# Patient Record
Sex: FEMALE | Race: WHITE | HISPANIC OR LATINO | Employment: OTHER | URBAN - METROPOLITAN AREA
[De-identification: names, ages, dates, MRNs, and addresses within clinical notes are randomized per-mention and may not be internally consistent; named-entity substitution may affect disease eponyms.]

---

## 2021-06-19 ENCOUNTER — APPOINTMENT (OUTPATIENT)
Dept: RADIOLOGY | Facility: MEDICAL CENTER | Age: 46
DRG: 064 | End: 2021-06-19
Attending: INTERNAL MEDICINE
Payer: COMMERCIAL

## 2021-06-19 ENCOUNTER — APPOINTMENT (OUTPATIENT)
Dept: RADIOLOGY | Facility: MEDICAL CENTER | Age: 46
DRG: 064 | End: 2021-06-19
Attending: EMERGENCY MEDICINE
Payer: COMMERCIAL

## 2021-06-19 ENCOUNTER — HOSPITAL ENCOUNTER (INPATIENT)
Facility: MEDICAL CENTER | Age: 46
LOS: 4 days | DRG: 064 | End: 2021-06-23
Attending: EMERGENCY MEDICINE | Admitting: HOSPITALIST
Payer: COMMERCIAL

## 2021-06-19 ENCOUNTER — APPOINTMENT (OUTPATIENT)
Dept: RADIOLOGY | Facility: MEDICAL CENTER | Age: 46
DRG: 064 | End: 2021-06-19
Attending: STUDENT IN AN ORGANIZED HEALTH CARE EDUCATION/TRAINING PROGRAM
Payer: COMMERCIAL

## 2021-06-19 DIAGNOSIS — I62.9 INTRACRANIAL HEMORRHAGE (HCC): ICD-10-CM

## 2021-06-19 DIAGNOSIS — G08 THROMBOSIS OF SUPERIOR SAGITTAL SINUS: ICD-10-CM

## 2021-06-19 DIAGNOSIS — R29.898 LEFT ARM WEAKNESS: ICD-10-CM

## 2021-06-19 PROBLEM — R10.9 ABDOMINAL SPASMS: Status: ACTIVE | Noted: 2021-06-19

## 2021-06-19 PROBLEM — Z85.3 HISTORY OF BREAST CANCER: Status: ACTIVE | Noted: 2021-06-19

## 2021-06-19 LAB
ABO GROUP BLD: NORMAL
ALBUMIN SERPL BCP-MCNC: 4.2 G/DL (ref 3.2–4.9)
ALBUMIN/GLOB SERPL: 1.4 G/DL
ALP SERPL-CCNC: 138 U/L (ref 30–99)
ALT SERPL-CCNC: 66 U/L (ref 2–50)
ANION GAP SERPL CALC-SCNC: 11 MMOL/L (ref 7–16)
APTT PPP: 21.7 SEC (ref 24.7–36)
AST SERPL-CCNC: 34 U/L (ref 12–45)
BASOPHILS # BLD AUTO: 1.3 % (ref 0–1.8)
BASOPHILS # BLD: 0.06 K/UL (ref 0–0.12)
BILIRUB SERPL-MCNC: 0.8 MG/DL (ref 0.1–1.5)
BLD GP AB SCN SERPL QL: NORMAL
BUN SERPL-MCNC: 7 MG/DL (ref 8–22)
CALCIUM SERPL-MCNC: 9.1 MG/DL (ref 8.5–10.5)
CFT BLD TEG: 4.5 MIN (ref 5–10)
CHLORIDE SERPL-SCNC: 100 MMOL/L (ref 96–112)
CHOLEST SERPL-MCNC: 121 MG/DL (ref 100–199)
CLOT ANGLE BLD TEG: 66.5 DEGREES (ref 53–72)
CLOT LYSIS 30M P MA LENFR BLD TEG: 0 % (ref 0–8)
CO2 SERPL-SCNC: 24 MMOL/L (ref 20–33)
CREAT SERPL-MCNC: 0.46 MG/DL (ref 0.5–1.4)
CT.EXTRINSIC BLD ROTEM: 1.8 MIN (ref 1–3)
EKG IMPRESSION: NORMAL
EOSINOPHIL # BLD AUTO: 0.06 K/UL (ref 0–0.51)
EOSINOPHIL NFR BLD: 1.3 % (ref 0–6.9)
ERYTHROCYTE [DISTWIDTH] IN BLOOD BY AUTOMATED COUNT: 55.5 FL (ref 35.9–50)
GLOBULIN SER CALC-MCNC: 3.1 G/DL (ref 1.9–3.5)
GLUCOSE SERPL-MCNC: 104 MG/DL (ref 65–99)
HCT VFR BLD AUTO: 39.3 % (ref 37–47)
HDLC SERPL-MCNC: 38 MG/DL
HGB BLD-MCNC: 13.4 G/DL (ref 12–16)
IMM GRANULOCYTES # BLD AUTO: 0.02 K/UL (ref 0–0.11)
IMM GRANULOCYTES NFR BLD AUTO: 0.4 % (ref 0–0.9)
INR PPP: 1.07 (ref 0.87–1.13)
LDLC SERPL CALC-MCNC: 58 MG/DL
LIPASE SERPL-CCNC: 24 U/L (ref 11–82)
LYMPHOCYTES # BLD AUTO: 1.67 K/UL (ref 1–4.8)
LYMPHOCYTES NFR BLD: 34.9 % (ref 22–41)
MCF BLD TEG: 64.2 MM (ref 50–70)
MCH RBC QN AUTO: 36.5 PG (ref 27–33)
MCHC RBC AUTO-ENTMCNC: 34.1 G/DL (ref 33.6–35)
MCV RBC AUTO: 107.1 FL (ref 81.4–97.8)
MONOCYTES # BLD AUTO: 0.76 K/UL (ref 0–0.85)
MONOCYTES NFR BLD AUTO: 15.9 % (ref 0–13.4)
NEUTROPHILS # BLD AUTO: 2.21 K/UL (ref 2–7.15)
NEUTROPHILS NFR BLD: 46.2 % (ref 44–72)
NRBC # BLD AUTO: 0 K/UL
NRBC BLD-RTO: 0 /100 WBC
PA AA BLD-ACNC: 0 %
PA ADP BLD-ACNC: 13.2 %
PLATELET # BLD AUTO: 265 K/UL (ref 164–446)
PMV BLD AUTO: 9.5 FL (ref 9–12.9)
POTASSIUM SERPL-SCNC: 3.8 MMOL/L (ref 3.6–5.5)
PROT SERPL-MCNC: 7.3 G/DL (ref 6–8.2)
PROTHROMBIN TIME: 13.6 SEC (ref 12–14.6)
RBC # BLD AUTO: 3.67 M/UL (ref 4.2–5.4)
RH BLD: NORMAL
SARS-COV+SARS-COV-2 AG RESP QL IA.RAPID: NOTDETECTED
SODIUM SERPL-SCNC: 135 MMOL/L (ref 135–145)
SODIUM SERPL-SCNC: 137 MMOL/L (ref 135–145)
SODIUM SERPL-SCNC: 140 MMOL/L (ref 135–145)
SPECIMEN SOURCE: NORMAL
TEG ALGORITHM TGALG: ABNORMAL
TRIGL SERPL-MCNC: 123 MG/DL (ref 0–149)
TROPONIN T SERPL-MCNC: <6 NG/L (ref 6–19)
WBC # BLD AUTO: 4.8 K/UL (ref 4.8–10.8)

## 2021-06-19 PROCEDURE — 85576 BLOOD PLATELET AGGREGATION: CPT | Mod: 91

## 2021-06-19 PROCEDURE — 85025 COMPLETE CBC W/AUTO DIFF WBC: CPT

## 2021-06-19 PROCEDURE — 700111 HCHG RX REV CODE 636 W/ 250 OVERRIDE (IP): Performed by: STUDENT IN AN ORGANIZED HEALTH CARE EDUCATION/TRAINING PROGRAM

## 2021-06-19 PROCEDURE — 770022 HCHG ROOM/CARE - ICU (200)

## 2021-06-19 PROCEDURE — 99291 CRITICAL CARE FIRST HOUR: CPT | Performed by: STUDENT IN AN ORGANIZED HEALTH CARE EDUCATION/TRAINING PROGRAM

## 2021-06-19 PROCEDURE — A9576 INJ PROHANCE MULTIPACK: HCPCS | Performed by: INTERNAL MEDICINE

## 2021-06-19 PROCEDURE — 700117 HCHG RX CONTRAST REV CODE 255: Performed by: EMERGENCY MEDICINE

## 2021-06-19 PROCEDURE — 700117 HCHG RX CONTRAST REV CODE 255: Performed by: INTERNAL MEDICINE

## 2021-06-19 PROCEDURE — C9803 HOPD COVID-19 SPEC COLLECT: HCPCS | Performed by: EMERGENCY MEDICINE

## 2021-06-19 PROCEDURE — U0003 INFECTIOUS AGENT DETECTION BY NUCLEIC ACID (DNA OR RNA); SEVERE ACUTE RESPIRATORY SYNDROME CORONAVIRUS 2 (SARS-COV-2) (CORONAVIRUS DISEASE [COVID-19]), AMPLIFIED PROBE TECHNIQUE, MAKING USE OF HIGH THROUGHPUT TECHNOLOGIES AS DESCRIBED BY CMS-2020-01-R: HCPCS

## 2021-06-19 PROCEDURE — 70450 CT HEAD/BRAIN W/O DYE: CPT

## 2021-06-19 PROCEDURE — 99291 CRITICAL CARE FIRST HOUR: CPT

## 2021-06-19 PROCEDURE — 74177 CT ABD & PELVIS W/CONTRAST: CPT

## 2021-06-19 PROCEDURE — U0005 INFEC AGEN DETEC AMPLI PROBE: HCPCS

## 2021-06-19 PROCEDURE — 86900 BLOOD TYPING SEROLOGIC ABO: CPT

## 2021-06-19 PROCEDURE — 85347 COAGULATION TIME ACTIVATED: CPT

## 2021-06-19 PROCEDURE — 84295 ASSAY OF SERUM SODIUM: CPT

## 2021-06-19 PROCEDURE — 93005 ELECTROCARDIOGRAM TRACING: CPT | Performed by: EMERGENCY MEDICINE

## 2021-06-19 PROCEDURE — 71045 X-RAY EXAM CHEST 1 VIEW: CPT

## 2021-06-19 PROCEDURE — 96375 TX/PRO/DX INJ NEW DRUG ADDON: CPT

## 2021-06-19 PROCEDURE — 70496 CT ANGIOGRAPHY HEAD: CPT

## 2021-06-19 PROCEDURE — 85730 THROMBOPLASTIN TIME PARTIAL: CPT

## 2021-06-19 PROCEDURE — 70553 MRI BRAIN STEM W/O & W/DYE: CPT

## 2021-06-19 PROCEDURE — 99221 1ST HOSP IP/OBS SF/LOW 40: CPT | Performed by: INTERNAL MEDICINE

## 2021-06-19 PROCEDURE — 96374 THER/PROPH/DIAG INJ IV PUSH: CPT

## 2021-06-19 PROCEDURE — 85384 FIBRINOGEN ACTIVITY: CPT

## 2021-06-19 PROCEDURE — 86901 BLOOD TYPING SEROLOGIC RH(D): CPT

## 2021-06-19 PROCEDURE — 80053 COMPREHEN METABOLIC PANEL: CPT

## 2021-06-19 PROCEDURE — 36415 COLL VENOUS BLD VENIPUNCTURE: CPT

## 2021-06-19 PROCEDURE — 94760 N-INVAS EAR/PLS OXIMETRY 1: CPT

## 2021-06-19 PROCEDURE — 700105 HCHG RX REV CODE 258: Performed by: INTERNAL MEDICINE

## 2021-06-19 PROCEDURE — 84484 ASSAY OF TROPONIN QUANT: CPT

## 2021-06-19 PROCEDURE — 700111 HCHG RX REV CODE 636 W/ 250 OVERRIDE (IP): Performed by: EMERGENCY MEDICINE

## 2021-06-19 PROCEDURE — 99221 1ST HOSP IP/OBS SF/LOW 40: CPT | Performed by: PSYCHIATRY & NEUROLOGY

## 2021-06-19 PROCEDURE — 85610 PROTHROMBIN TIME: CPT

## 2021-06-19 PROCEDURE — 700111 HCHG RX REV CODE 636 W/ 250 OVERRIDE (IP): Performed by: INTERNAL MEDICINE

## 2021-06-19 PROCEDURE — 86850 RBC ANTIBODY SCREEN: CPT

## 2021-06-19 PROCEDURE — 700111 HCHG RX REV CODE 636 W/ 250 OVERRIDE (IP): Performed by: PSYCHIATRY & NEUROLOGY

## 2021-06-19 PROCEDURE — 0042T CT-CEREBRAL PERFUSION ANALYSIS: CPT

## 2021-06-19 PROCEDURE — 80061 LIPID PANEL: CPT

## 2021-06-19 PROCEDURE — 87426 SARSCOV CORONAVIRUS AG IA: CPT

## 2021-06-19 PROCEDURE — 70498 CT ANGIOGRAPHY NECK: CPT

## 2021-06-19 PROCEDURE — 83690 ASSAY OF LIPASE: CPT

## 2021-06-19 RX ORDER — ACETAMINOPHEN 500 MG
1000 TABLET ORAL 2 TIMES DAILY PRN
COMMUNITY

## 2021-06-19 RX ORDER — LABETALOL HYDROCHLORIDE 5 MG/ML
10 INJECTION, SOLUTION INTRAVENOUS EVERY 4 HOURS PRN
Status: DISCONTINUED | OUTPATIENT
Start: 2021-06-19 | End: 2021-06-23 | Stop reason: HOSPADM

## 2021-06-19 RX ORDER — ONDANSETRON 2 MG/ML
4 INJECTION INTRAMUSCULAR; INTRAVENOUS EVERY 4 HOURS PRN
Status: DISCONTINUED | OUTPATIENT
Start: 2021-06-19 | End: 2021-06-23 | Stop reason: HOSPADM

## 2021-06-19 RX ORDER — BISACODYL 10 MG
10 SUPPOSITORY, RECTAL RECTAL
Status: DISCONTINUED | OUTPATIENT
Start: 2021-06-19 | End: 2021-06-23 | Stop reason: HOSPADM

## 2021-06-19 RX ORDER — ACETAMINOPHEN 650 MG/1
650 SUPPOSITORY RECTAL EVERY 4 HOURS PRN
Status: DISCONTINUED | OUTPATIENT
Start: 2021-06-19 | End: 2021-06-19

## 2021-06-19 RX ORDER — LEVETIRACETAM 5 MG/ML
500 INJECTION INTRAVASCULAR EVERY 12 HOURS
Status: DISCONTINUED | OUTPATIENT
Start: 2021-06-19 | End: 2021-06-19

## 2021-06-19 RX ORDER — POLYETHYLENE GLYCOL 3350 17 G/17G
1 POWDER, FOR SOLUTION ORAL
Status: DISCONTINUED | OUTPATIENT
Start: 2021-06-19 | End: 2021-06-23 | Stop reason: HOSPADM

## 2021-06-19 RX ORDER — DEXAMETHASONE SODIUM PHOSPHATE 4 MG/ML
10 INJECTION, SOLUTION INTRA-ARTICULAR; INTRALESIONAL; INTRAMUSCULAR; INTRAVENOUS; SOFT TISSUE EVERY 6 HOURS
Status: DISCONTINUED | OUTPATIENT
Start: 2021-06-19 | End: 2021-06-19

## 2021-06-19 RX ORDER — CLONIDINE HYDROCHLORIDE 0.1 MG/1
0.1 TABLET ORAL EVERY 6 HOURS PRN
Status: DISCONTINUED | OUTPATIENT
Start: 2021-06-19 | End: 2021-06-19

## 2021-06-19 RX ORDER — ACETAMINOPHEN 325 MG/1
650 TABLET ORAL EVERY 6 HOURS PRN
Status: DISCONTINUED | OUTPATIENT
Start: 2021-06-19 | End: 2021-06-19

## 2021-06-19 RX ORDER — LEVETIRACETAM 10 MG/ML
1000 INJECTION INTRAVASCULAR ONCE
Status: COMPLETED | OUTPATIENT
Start: 2021-06-19 | End: 2021-06-19

## 2021-06-19 RX ORDER — PROCHLORPERAZINE EDISYLATE 5 MG/ML
5-10 INJECTION INTRAMUSCULAR; INTRAVENOUS EVERY 4 HOURS PRN
Status: DISCONTINUED | OUTPATIENT
Start: 2021-06-19 | End: 2021-06-23 | Stop reason: HOSPADM

## 2021-06-19 RX ORDER — PROMETHAZINE HYDROCHLORIDE 25 MG/1
12.5-25 TABLET ORAL EVERY 4 HOURS PRN
Status: DISCONTINUED | OUTPATIENT
Start: 2021-06-19 | End: 2021-06-23 | Stop reason: HOSPADM

## 2021-06-19 RX ORDER — ACETAMINOPHEN 325 MG/1
650 TABLET ORAL EVERY 4 HOURS PRN
Status: DISCONTINUED | OUTPATIENT
Start: 2021-06-19 | End: 2021-06-19

## 2021-06-19 RX ORDER — ONDANSETRON 4 MG/1
4 TABLET, ORALLY DISINTEGRATING ORAL EVERY 4 HOURS PRN
Status: DISCONTINUED | OUTPATIENT
Start: 2021-06-19 | End: 2021-06-23 | Stop reason: HOSPADM

## 2021-06-19 RX ORDER — ENALAPRILAT 1.25 MG/ML
1.25 INJECTION INTRAVENOUS EVERY 6 HOURS PRN
Status: DISCONTINUED | OUTPATIENT
Start: 2021-06-19 | End: 2021-06-23 | Stop reason: HOSPADM

## 2021-06-19 RX ORDER — ACETAMINOPHEN 650 MG/1
650 SUPPOSITORY RECTAL EVERY 4 HOURS PRN
Status: DISCONTINUED | OUTPATIENT
Start: 2021-06-19 | End: 2021-06-23 | Stop reason: HOSPADM

## 2021-06-19 RX ORDER — ENALAPRILAT 1.25 MG/ML
1.25 INJECTION INTRAVENOUS EVERY 6 HOURS PRN
Status: DISCONTINUED | OUTPATIENT
Start: 2021-06-19 | End: 2021-06-19

## 2021-06-19 RX ORDER — ACETAMINOPHEN 325 MG/1
650 TABLET ORAL EVERY 4 HOURS PRN
Status: DISCONTINUED | OUTPATIENT
Start: 2021-06-19 | End: 2021-06-23 | Stop reason: HOSPADM

## 2021-06-19 RX ORDER — AMOXICILLIN 250 MG
2 CAPSULE ORAL 2 TIMES DAILY
Status: DISCONTINUED | OUTPATIENT
Start: 2021-06-20 | End: 2021-06-23 | Stop reason: HOSPADM

## 2021-06-19 RX ORDER — LABETALOL HYDROCHLORIDE 5 MG/ML
10 INJECTION, SOLUTION INTRAVENOUS EVERY 4 HOURS PRN
Status: DISCONTINUED | OUTPATIENT
Start: 2021-06-19 | End: 2021-06-19

## 2021-06-19 RX ORDER — LEVETIRACETAM 10 MG/ML
1000 INJECTION INTRAVASCULAR EVERY 12 HOURS
Status: DISCONTINUED | OUTPATIENT
Start: 2021-06-20 | End: 2021-06-19

## 2021-06-19 RX ORDER — ERGOCALCIFEROL 1.25 MG/1
50000 CAPSULE ORAL
COMMUNITY

## 2021-06-19 RX ORDER — PROMETHAZINE HYDROCHLORIDE 25 MG/1
12.5-25 SUPPOSITORY RECTAL EVERY 4 HOURS PRN
Status: DISCONTINUED | OUTPATIENT
Start: 2021-06-19 | End: 2021-06-23 | Stop reason: HOSPADM

## 2021-06-19 RX ORDER — HYDRALAZINE HYDROCHLORIDE 20 MG/ML
10 INJECTION INTRAMUSCULAR; INTRAVENOUS EVERY 4 HOURS PRN
Status: DISCONTINUED | OUTPATIENT
Start: 2021-06-19 | End: 2021-06-23 | Stop reason: HOSPADM

## 2021-06-19 RX ORDER — SODIUM CHLORIDE 9 MG/ML
INJECTION, SOLUTION INTRAVENOUS CONTINUOUS
Status: DISCONTINUED | OUTPATIENT
Start: 2021-06-19 | End: 2021-06-20

## 2021-06-19 RX ORDER — LEVETIRACETAM 5 MG/ML
500 INJECTION INTRAVASCULAR EVERY 12 HOURS
Status: DISCONTINUED | OUTPATIENT
Start: 2021-06-20 | End: 2021-06-20

## 2021-06-19 RX ORDER — CAPECITABINE 500 MG/1
2000 TABLET, FILM COATED ORAL 2 TIMES DAILY
Status: ON HOLD | COMMUNITY
End: 2021-06-23

## 2021-06-19 RX ORDER — LORAZEPAM 2 MG/ML
2 INJECTION INTRAMUSCULAR
Status: DISCONTINUED | OUTPATIENT
Start: 2021-06-19 | End: 2021-06-23 | Stop reason: HOSPADM

## 2021-06-19 RX ORDER — DEXAMETHASONE SODIUM PHOSPHATE 4 MG/ML
4 INJECTION, SOLUTION INTRA-ARTICULAR; INTRALESIONAL; INTRAMUSCULAR; INTRAVENOUS; SOFT TISSUE EVERY 6 HOURS
Status: DISCONTINUED | OUTPATIENT
Start: 2021-06-19 | End: 2021-06-20

## 2021-06-19 RX ADMIN — GADOTERIDOL 15 ML: 279.3 INJECTION, SOLUTION INTRAVENOUS at 16:26

## 2021-06-19 RX ADMIN — ENOXAPARIN SODIUM 80 MG: 80 INJECTION SUBCUTANEOUS at 19:30

## 2021-06-19 RX ADMIN — LEVETIRACETAM INJECTION 1000 MG: 10 INJECTION INTRAVENOUS at 13:08

## 2021-06-19 RX ADMIN — DEXAMETHASONE SODIUM PHOSPHATE 10 MG: 4 INJECTION, SOLUTION INTRA-ARTICULAR; INTRALESIONAL; INTRAMUSCULAR; INTRAVENOUS; SOFT TISSUE at 13:43

## 2021-06-19 RX ADMIN — DEXAMETHASONE SODIUM PHOSPHATE 4 MG: 4 INJECTION, SOLUTION INTRA-ARTICULAR; INTRALESIONAL; INTRAMUSCULAR; INTRAVENOUS; SOFT TISSUE at 18:01

## 2021-06-19 RX ADMIN — SODIUM CHLORIDE: 9 INJECTION, SOLUTION INTRAVENOUS at 16:46

## 2021-06-19 RX ADMIN — IOHEXOL 50 ML: 350 INJECTION, SOLUTION INTRAVENOUS at 12:56

## 2021-06-19 RX ADMIN — FAMOTIDINE 20 MG: 10 INJECTION INTRAVENOUS at 18:01

## 2021-06-19 RX ADMIN — IOHEXOL 70 ML: 350 INJECTION, SOLUTION INTRAVENOUS at 12:54

## 2021-06-19 ASSESSMENT — ENCOUNTER SYMPTOMS
HEADACHES: 0
SEIZURES: 1
FALLS: 0
DOUBLE VISION: 0
POLYDIPSIA: 0
TREMORS: 0
FOCAL WEAKNESS: 1
HALLUCINATIONS: 0
PALPITATIONS: 0
FEVER: 0
ORTHOPNEA: 0
CONSTIPATION: 0
PHOTOPHOBIA: 0
HEMOPTYSIS: 0
SPEECH CHANGE: 0
VOMITING: 0
NECK PAIN: 0
SENSORY CHANGE: 1
BLURRED VISION: 0
WEAKNESS: 1
BRUISES/BLEEDS EASILY: 0
WEIGHT LOSS: 0
SHORTNESS OF BREATH: 0
CHILLS: 0
BACK PAIN: 0
TREMORS: 1
FLANK PAIN: 0
COUGH: 0
EYE PAIN: 0
DIARRHEA: 0
NERVOUS/ANXIOUS: 0
NAUSEA: 0
ABDOMINAL PAIN: 1
HEARTBURN: 0
SPUTUM PRODUCTION: 0

## 2021-06-19 ASSESSMENT — COPD QUESTIONNAIRES
COPD SCREENING SCORE: 0
DURING THE PAST 4 WEEKS HOW MUCH DID YOU FEEL SHORT OF BREATH: NONE/LITTLE OF THE TIME
HAVE YOU SMOKED AT LEAST 100 CIGARETTES IN YOUR ENTIRE LIFE: NO/DON'T KNOW
DO YOU EVER COUGH UP ANY MUCUS OR PHLEGM?: NO/ONLY WITH OCCASIONAL COLDS OR INFECTIONS

## 2021-06-19 ASSESSMENT — FIBROSIS 4 INDEX: FIB4 SCORE: 0.71

## 2021-06-19 ASSESSMENT — LIFESTYLE VARIABLES: SUBSTANCE_ABUSE: 0

## 2021-06-19 NOTE — ED NOTES
Med rec updated per pt and family at bedside  Pt reports she is taking an oral chemotherapy but unsure of the name  States she takes 4 tablets twice daily x2 weeks on, x1 week off  Has been taking for 6 months and states after this 2 week period, she is completely finished with taking it  Family at bedside is leaving to go get the medication to bring it back for verification  Will update med rec when able      Allergies reviewed - NKDA  No ABX in last 2 weeks

## 2021-06-19 NOTE — ASSESSMENT & PLAN NOTE
S/p lumpectomy, XRT, IV chemo and is now finishing course of po Xeloda  Deshpande CT done here (chest/abd/plvs) showing no evidence of metastatsis  MRI brain likewise neg for mets  F/u Onc in [place]

## 2021-06-19 NOTE — CONSULTS
Critical Care Consultation    Date of consult: 6/19/2021    Referring Physician  Viridiana Lao M.D.    Reason for Consultation  Intracranial hemorrhage    History of Presenting Illness  46 y.o. female undergoing treatment for breast cancer on oral chemotherapy for the past year, s/p left pneumonectomy who presented 6/19/2021 after suddenly developing LAWR[place] weakness and numbness as well as shaking of her left arm and spasms of her left abdomen which occurred at 1155AM on the morning of presentation. She endorses a month long history of fatigue, denies any headaches or recent signs or symptoms of infection, no chest pain or dyspnea, no speech changes or facial droop, no confusion. NIH score 3 on arrival, code stroke activated, CTH w/out contrast showed an intraparenchymal hemorrhage with edema in the right parietal lobe. CTA head and neck w/wo contrast showed no LVO, high-grade stenosis, or aneurysm. Neurology VÍCTOR Mackay was consulted. Dr. Balbuena from neurosurgery was consulted as well. Patient was given Keppra 1g IVPB and dexamethasone 10 mg IV in the ED. Patient admitted to ICU for further evaluation and care.     Code Status  Full Code    Review of Systems  Review of Systems   Constitutional: Positive for malaise/fatigue. Negative for chills and fever.   HENT: Negative for congestion and nosebleeds.    Eyes: Negative for photophobia and pain.   Respiratory: Negative for cough and shortness of breath.    Cardiovascular: Negative for chest pain and orthopnea.   Gastrointestinal: Positive for abdominal pain. Negative for nausea and vomiting.   Genitourinary: Negative for dysuria and urgency.   Musculoskeletal: Negative for falls and joint pain.   Neurological: Positive for tremors, sensory change, focal weakness and seizures (possible partial seizure activity ).   Psychiatric/Behavioral: Negative for substance abuse. The patient is not nervous/anxious.        Past Medical History   has a past medical  history of Cancer (HCC).    Surgical History   past surgical history Left pneumonectomy    Family History  family history htn.    Social History   reports that she has never smoked. She does not have any smokeless tobacco history on file.    Medications  Home Medications     Reviewed by Marcie Lewis (Pharmacy Intern) on 06/19/21 at 1417  Med List Status: Complete   Medication Last Dose Status   acetaminophen (TYLENOL) 500 MG Tab >week Active   capecitabine (XELODA) 500 MG tablet 6/19/2021 Active   vitamin D, Ergocalciferol, (DRISDOL) 1.25 MG (84013 UT) Cap capsule 6/16/2021 Active              Current Facility-Administered Medications   Medication Dose Route Frequency Provider Last Rate Last Admin   • [START ON 6/20/2021] senna-docusate (PERICOLACE or SENOKOT S) 8.6-50 MG per tablet 2 tablet  2 tablet Oral BID Chris Rajan M.D.        And   • polyethylene glycol/lytes (MIRALAX) PACKET 1 Packet  1 Packet Oral QDAY PRN Chris Rajan M.D.        And   • magnesium hydroxide (MILK OF MAGNESIA) suspension 30 mL  30 mL Oral QDAY PRN Chris Rajan M.D.        And   • bisacodyl (DULCOLAX) suppository 10 mg  10 mg Rectal QDAY PRN Chris Rajan M.D.       • Respiratory Therapy Consult   Nebulization Continuous RT Chris Rajan M.D.       • NS infusion   Intravenous Continuous Chris Rajan M.D. 80 mL/hr at 06/19/21 1646 New Bag at 06/19/21 1646   • LORazepam (ATIVAN) injection 2 mg  2 mg Intravenous Q5 MIN PRN Chris Rajan M.D.       • ondansetron (ZOFRAN ODT) dispertab 4 mg  4 mg Oral Q4HRS PRN Chris Rajan M.D.        Or   • ondansetron (ZOFRAN) syringe/vial injection 4 mg  4 mg Intravenous Q4HRS PRN Chris Rajan M.D.       • MD Alert...ICU Electrolyte Replacement per Pharmacy   Other PHARMACY TO DOSE Chris Rajan M.D.       • promethazine (PHENERGAN) tablet 12.5-25 mg  12.5-25 mg Oral Q4HRS PRN Chris Rajan M.D.       • promethazine (PHENERGAN) suppository 12.5-25  mg  12.5-25 mg Rectal Q4HRS PRN Chris Rajan M.D.       • prochlorperazine (COMPAZINE) injection 5-10 mg  5-10 mg Intravenous Q4HRS PRN Chris Rajan M.D.       • niCARdipine (CARDENE) 25 mg in  mL Infusion  0-15 mg/hr Intravenous Continuous Chris Rajan M.D.   Held at 06/19/21 1400   • famotidine (PEPCID) injection 20 mg  20 mg Intravenous BID Chris Rajan M.D.       • acetaminophen (Tylenol) tablet 650 mg  650 mg Oral Q4HRS PRN Sal Arndt M.D.        Or   • acetaminophen (TYLENOL) suppository 650 mg  650 mg Rectal Q4HRS PRN Sal Arndt M.D.       • labetalol (NORMODYNE/TRANDATE) injection 10 mg  10 mg Intravenous Q4HRS PRN Sal Arndt M.D.       • hydrALAZINE (APRESOLINE) injection 10 mg  10 mg Intravenous Q4HRS PRN Sal Arndt M.D.       • enalaprilat (VASOTEC) injection 1.25 mg  1.25 mg Intravenous Q6HRS PRN Sal Arndt M.D.       • dexamethasone (DECADRON) injection 4 mg  4 mg Intravenous Q6HRS Sal Arndt M.D.       • [START ON 6/20/2021] levETIRAcetam (Keppra) 500 mg in 100 mL NaCl IV premix  500 mg Intravenous Q12HRS Sal Arndt M.D.           Allergies  No Known Allergies    Vital Signs last 24 hours  Temp:  [36.1 °C (97 °F)-36.6 °C (97.8 °F)] 36.1 °C (97 °F)  Pulse:  [] 96  Resp:  [14-25] 25  BP: (124-137)/(70-84) 124/70  SpO2:  [94 %-97 %] 95 %    Physical Exam  Physical Exam  Vitals and nursing note reviewed. Exam conducted with a chaperone present.   Constitutional:       General: She is not in acute distress.     Appearance: She is not ill-appearing.      Comments: 46 year old female appears stated age. Lying on ED gurney without distress, alert and conversant.    HENT:      Head: Normocephalic and atraumatic.      Nose: Nose normal. No rhinorrhea.      Mouth/Throat:      Mouth: Mucous membranes are moist.      Pharynx: Oropharynx is clear.   Eyes:      Extraocular Movements: Extraocular movements intact.      Conjunctiva/sclera:  Conjunctivae normal.      Pupils: Pupils are equal, round, and reactive to light.   Cardiovascular:      Rate and Rhythm: Normal rate and regular rhythm.      Pulses: Normal pulses.   Pulmonary:      Effort: Pulmonary effort is normal. No respiratory distress.      Breath sounds: Normal breath sounds. No wheezing or rhonchi.   Abdominal:      General: Bowel sounds are normal. There is no distension.      Palpations: Abdomen is soft.      Tenderness: There is no abdominal tenderness. There is no guarding.   Musculoskeletal:         General: No tenderness or deformity. Normal range of motion.      Cervical back: Normal range of motion and neck supple.   Skin:     General: Skin is warm and dry.   Neurological:      Mental Status: She is alert and oriented to person, place, and time.      Cranial Nerves: No cranial nerve deficit.      Sensory: Sensory deficit (LUE decreased sensation) present.      Motor: Weakness (LUE 4/5 proximal and distal) present.   Psychiatric:         Mood and Affect: Mood normal.         Behavior: Behavior normal.         Thought Content: Thought content normal.         Judgment: Judgment normal.         Fluids    Intake/Output Summary (Last 24 hours) at 6/19/2021 1749  Last data filed at 6/19/2021 1646  Gross per 24 hour   Intake 100 ml   Output 500 ml   Net -400 ml       Laboratory  Recent Results (from the past 48 hour(s))   CBC WITH DIFFERENTIAL    Collection Time: 06/19/21 12:28 PM   Result Value Ref Range    WBC 4.8 4.8 - 10.8 K/uL    RBC 3.67 (L) 4.20 - 5.40 M/uL    Hemoglobin 13.4 12.0 - 16.0 g/dL    Hematocrit 39.3 37.0 - 47.0 %    .1 (H) 81.4 - 97.8 fL    MCH 36.5 (H) 27.0 - 33.0 pg    MCHC 34.1 33.6 - 35.0 g/dL    RDW 55.5 (H) 35.9 - 50.0 fL    Platelet Count 265 164 - 446 K/uL    MPV 9.5 9.0 - 12.9 fL    Neutrophils-Polys 46.20 44.00 - 72.00 %    Lymphocytes 34.90 22.00 - 41.00 %    Monocytes 15.90 (H) 0.00 - 13.40 %    Eosinophils 1.30 0.00 - 6.90 %    Basophils 1.30 0.00 -  1.80 %    Immature Granulocytes 0.40 0.00 - 0.90 %    Nucleated RBC 0.00 /100 WBC    Neutrophils (Absolute) 2.21 2.00 - 7.15 K/uL    Lymphs (Absolute) 1.67 1.00 - 4.80 K/uL    Monos (Absolute) 0.76 0.00 - 0.85 K/uL    Eos (Absolute) 0.06 0.00 - 0.51 K/uL    Baso (Absolute) 0.06 0.00 - 0.12 K/uL    Immature Granulocytes (abs) 0.02 0.00 - 0.11 K/uL    NRBC (Absolute) 0.00 K/uL   COMP METABOLIC PANEL    Collection Time: 06/19/21 12:28 PM   Result Value Ref Range    Sodium 135 135 - 145 mmol/L    Potassium 3.8 3.6 - 5.5 mmol/L    Chloride 100 96 - 112 mmol/L    Co2 24 20 - 33 mmol/L    Anion Gap 11.0 7.0 - 16.0    Glucose 104 (H) 65 - 99 mg/dL    Bun 7 (L) 8 - 22 mg/dL    Creatinine 0.46 (L) 0.50 - 1.40 mg/dL    Calcium 9.1 8.5 - 10.5 mg/dL    AST(SGOT) 34 12 - 45 U/L    ALT(SGPT) 66 (H) 2 - 50 U/L    Alkaline Phosphatase 138 (H) 30 - 99 U/L    Total Bilirubin 0.8 0.1 - 1.5 mg/dL    Albumin 4.2 3.2 - 4.9 g/dL    Total Protein 7.3 6.0 - 8.2 g/dL    Globulin 3.1 1.9 - 3.5 g/dL    A-G Ratio 1.4 g/dL   PROTHROMBIN TIME    Collection Time: 06/19/21 12:28 PM   Result Value Ref Range    PT 13.6 12.0 - 14.6 sec    INR 1.07 0.87 - 1.13   APTT    Collection Time: 06/19/21 12:28 PM   Result Value Ref Range    APTT 21.7 (L) 24.7 - 36.0 sec   COD (ADULT)    Collection Time: 06/19/21 12:28 PM   Result Value Ref Range    ABO Grouping Only A     Rh Grouping Only POS     Antibody Screen-Cod NEG    TROPONIN    Collection Time: 06/19/21 12:28 PM   Result Value Ref Range    Troponin T <6 6 - 19 ng/L   ESTIMATED GFR    Collection Time: 06/19/21 12:28 PM   Result Value Ref Range    GFR If African American >60 >60 mL/min/1.73 m 2    GFR If Non African American >60 >60 mL/min/1.73 m 2   SARS-COV Antigen HESHAM: Collect dry nasal swab AND NP swab in VTM    Collection Time: 06/19/21  1:21 PM   Result Value Ref Range    SARS-CoV-2 Source Nasal Swab     SARS-COV ANTIGEN HESHAM NotDetected Not-Detected   SARS-CoV-2 PCR (24 hour In-House): Collect NP swab  in VTM    Collection Time: 06/19/21  1:21 PM    Specimen: Respirate   Result Value Ref Range    SARS-CoV-2 Source NP Swab    Sodium Serum (NA)    Collection Time: 06/19/21  2:00 PM   Result Value Ref Range    Sodium 137 135 - 145 mmol/L   Platelet Mapping with Basic TEG    Collection Time: 06/19/21  2:00 PM   Result Value Ref Range    Reaction Time Initial-R 4.5 (L) 5.0 - 10.0 min    Clot Kinetics-K 1.8 1.0 - 3.0 min    Clot Angle-Angle 66.5 53.0 - 72.0 degrees    Maximum Clot Strength-MA 64.2 50.0 - 70.0 mm    Lysis 30 minutes-LY30 0.0 0.0 - 8.0 %    % Inhibition ADP 13.2 %    % Inhibition AA 0.0 %    TEG Algorithm Link Algorithm    Lipid Profile    Collection Time: 06/19/21  2:00 PM   Result Value Ref Range    Cholesterol,Tot 121 100 - 199 mg/dL    Triglycerides 123 0 - 149 mg/dL    HDL 38 (A) >=40 mg/dL    LDL 58 <100 mg/dL   LIPASE    Collection Time: 06/19/21  2:00 PM   Result Value Ref Range    Lipase 24 11 - 82 U/L       Imaging  MR-BRAIN-WITH & W/O   Final Result      1.  RIGHT frontoparietal parenchymal hemorrhage without evidence of underlying mass or vascular malformation. This appears slightly larger than on the CT.   2.   Small amount of RIGHT supratentorial subarachnoid blood   3.  No herniation      DX-CHEST-PORTABLE (1 VIEW)   Final Result         No acute cardiac or pulmonary abnormality is identified.      CT-ABDOMEN-PELVIS WITH   Final Result      1.  No CT evidence of metastasis      2.  Moderate hepatomegaly with severe steatosis      3.  Lower-outer left breast postoperative seroma favored over abscess or residual mass. Recommend clinical correlation      CT-CEREBRAL PERFUSION ANALYSIS   Final Result      1.  Cerebral blood flow less than 30% likely representing completed infarct = 0 mL.      2.  T Max more than 6 seconds likely representing combination of completed infarct and ischemia = 0 mL.      3.  Mismatched volume likely representing ischemic brain/penumbra = None      4.  Please note  that the cerebral perfusion was performed on the limited brain tissue around the basal ganglia region. Infarct/ischemia outside the CT perfusion sections can be missed in this study.      CT-CTA NECK WITH & W/O-POST PROCESSING   Final Result      CT angiogram of the neck within normal limits.      CT-CTA HEAD WITH & W/O-POST PROCESS   Final Result      CT angiogram of the [place] within normal limits.      CT-HEAD W/O   Final Result         1.  Multifocal parenchymal hemorrhage with some surrounding edema is identified in the right parietal lobe as described above. Differential diagnosis does include parenchymal bleeding related to a brain neoplasm. MRI with and without contrast could be    obtained for further evaluation.      2.  Mild adjacent subarachnoid hemorrhage is also noted.      These findings were discussed with NIKOLAS DICKINSON on 06/19/2021.          Assessment/Plan  * Intracranial hemorrhage (HCC)- (present on admission)  Assessment & Plan  ICH score:   Etiology:  Neuro checks and VS per protocol  SBP Goal <140mmHg; hydralazine, labetalol and nicardipine PRN  MRI performed 4.5 hours after initial CTH shows slightly larger hemorrhage without evidence of underlying mass or vascular malformation.  Repeat head CT ordered for 2200 6/19 to monitor.   Keppra 1g IVPB and dexamethasone 10mg IV given in ER per neurosurgery Dr. Balbuena  Keppra 500 mg twice daily, Decadron 4 milligrams every 6.  Seizure, aspiration, and fall precautions.  NPO, cortrak placement as needed for enteral access.  SLP/PT/OT evaluation  Troponin & Coags wnl, TEG with slightly decreased R time  DVT PPX: SCDs  Glucose control to maintain <180           History of breast cancer- (present on admission)  Assessment & Plan  On PO chemotherapy for 1 year treated by oncology team in [place].  Obtain outside records for further details.      Abdominal spasms- (present on admission)  Assessment & Plan  -? partial seizure like activity  observed by neurology and ERP, resolved.   -CT abdomen obtained in ED showed moderate hepatomegaly and severe steatosis, Left outer breast postop seroma.   -Monitor for reoccurrence.       Discussed patient condition and risk of morbidity and/or mortality with Hospitalist, Family, RN, RT, Pharmacy and Patient.   Discussed with Dr. Mcdonough from critical care who agrees with admission.    The patient remains critically ill with expanding ICH and requires hourly neuro monitoring and close monitoring of hemodynamic status to prevent sudden decline leading to permanent injury or death.  Critical care time = 55 minutes in directly providing and coordinating critical care and extensive data review.  No time overlap and excludes procedures.

## 2021-06-19 NOTE — CONSULTS
Neurology STROKE CODE H&P  Neurohospitalist Service, [facility]    Referring Physician: Viridiana Lao M.D.    STROKE CODE:   Chief Complaint   Patient presents with   • Extremity Weakness     LKW 11:55 sudden onset LUE weakness/numbness, LUE drift noted at triage. No other stroke sx noted at triage by neurology   • Abdominal Pain     LLQ pain       To obtain the most accurate data regarding the time called, and time patient seen, refer to the stroke run-sheet and chart.  For time of CT, refer to the radiology report. See A&P below for TPA Decision and door to needle time if and when applicable.    HPI: Pily Buckley is a 46 y.o. female with history of active breast cancer currently undergoing chemotherapy presenting to the hospital for left arm weakness and numbness and consulted for possible stroke. Patient was at home playing with her dog when she and her family noted acute onset of left arm tremor like movements with left arm weakness and numbness at 11:55. EMS was notified and patient was transported to [facility]. Neurology was consulted via Code Stroke pre-alert called at 12:17. Initial BP of 133/84 and  mg/dL per EMS. She does not take any blood thinners. NIHSS is 3 significant for left arm weakness with drift, left arm numbness, and extinction with partial neglect of the left. Additionally, patient complains of left lower quadrant abdominal pain 7/10 in severity, and this NP and the ERP noted spasmodic movements of the abdomen. Otherwise she denies headache, vision changes, facial weakness, speech changes, ataxia, loss of consciousness, memory loss, or confusion.     Review of systems: In addition to what is detailed in the HPI above, all other systems reviewed and are negative.    Past Medical History:    has a past medical history of Cancer (HCC).    FHx:  family history is not on file.    SHx:   reports that she has never smoked. She does not have any  "smokeless tobacco history on file.    Allergies:  No Known Allergies    Medications:    Current Facility-Administered Medications:   •  levETIRAcetam (Keppra) 1000 mg in 100 mL NaCl IV premix, 1,000 mg, Intravenous, Once, Viridiana Lao M.D., Last Rate: 400 mL/hr at 06/19/21 1308, 1,000 mg at 06/19/21 1308  •  dexamethasone (DECADRON) injection 10 mg, 10 mg, Intravenous, Q6HRS, Viridiana Lao M.D.  No current outpatient medications on file.    Physical Examination:    Vitals:    06/19/21 1233 06/19/21 1235 06/19/21 1251   BP:  133/84 136/82   Pulse:  94 100   Resp:  16    Temp:   36.6 °C (97.8 °F)   TempSrc:   Temporal   SpO2:  94% 97%   Weight: 71.7 kg (158 lb)     Height: 1.549 m (5' 1\")         General: Patient is awake and in no acute distress  Eyes: Examination of optic disks not indicated at this time given acuity of consult.  CV: RRR  GI: LLQ abdominal pain     NEUROLOGICAL EXAM:     Mental status: Awake, alert, fully oriented, and follows commands.  Speech and language: Speech is clear and fluent. The patient is able to name, repeat, and comprehend.  Cranial nerve exam: Pupils are equal, round and reactive to light bilaterally. Visual fields are full. Extraocular muscles are intact. Sensation in the face is intact to light touch. Face is symmetric. Palate elevates symmetrically. Shoulder shrug is full. Tongue is midline without signs of trauma/biting.  Motor exam: Antigravity in all extremities, with LUE drift and 4/5 strength both distally and proximally. Tone is normal. Abnormal involuntary movements of the abdomen were seen on exam.  Sensory exam: Decreased sensation to tactile stimuli of entire LUE.    Deep tendon reflexes:  Toes down-going bilaterally.  Coordination: No ataxia with normal finger-nose-finger and heel-down-shin.   Gait: deferred as patient is actively transported to CT scanner.     NIH Stroke Scale:    1a. Level of Consciousness (Alert, drowsy, etc): 0= Alert    1b. LOC Questions " (Month, age): 0= Answers both correctly    1c. LOC Commands (Open/close eyes make fist/let go): 0= Obeys both correctly    2.   Best Gaze (Eyes open - patient follows examiner's finger on face): 0= Normal    3.   Visual Fields (introduce visual stimulus/threat to patient's field quadrants): 0= No visual loss  4.   Facial Paresis (Show teeth, raise eyebrows and squeeze eyes shut): 0= Normal     5a. Motor Arm - Left (Elevate arm to 90 degrees if patient is sitting, 45 degrees if  supine): 1= Drift    5b. Motor Arm - Right (Elevate arm to 90 degrees if patient is sitting, 45 degrees if supine): 0= No drift    6a. Motor Leg - Left (Elevate leg 30 degrees with patient supine): 0= No drift    6b. Motor Leg - Right  (Elevate leg 30 degrees with patient supine): 0= No drift    7.   Limb Ataxia (Finger-nose, heel down shin): 0= No ataxia    8.   Sensory (Pin prick to face, arm, trunk and leg - compare side to side): 1= Partial loss    9.  Best Language (Name item, describe a picture and read sentences): 0= No aphasia    10. Dysarthria (Evaluate speech clarity by patient repeating listed words): 0= Normal articulation    11. Extinction and Inattention (Use information from prior testing to identify neglect or  double simultaneous stimuli testing): 1= Partial neglect    Total NIH Score: 3    Pre-stroke Modified George Scale (MRS): 1 = No significant disability, despite symptoms; able to perform all usual duties and activities      Objective Data:    Labs:  Lab Results   Component Value Date/Time    PROTHROMBTM 13.6 06/19/2021 12:28 PM    INR 1.07 06/19/2021 12:28 PM      Lab Results   Component Value Date/Time    WBC 4.8 06/19/2021 12:28 PM    RBC 3.67 (L) 06/19/2021 12:28 PM    HEMOGLOBIN 13.4 06/19/2021 12:28 PM    HEMATOCRIT 39.3 06/19/2021 12:28 PM    .1 (H) 06/19/2021 12:28 PM    MCH 36.5 (H) 06/19/2021 12:28 PM    MCHC 34.1 06/19/2021 12:28 PM    MPV 9.5 06/19/2021 12:28 PM    NEUTSPOLYS 46.20 06/19/2021 12:28 PM     LYMPHOCYTES 34.90 06/19/2021 12:28 PM    MONOCYTES 15.90 (H) 06/19/2021 12:28 PM    EOSINOPHILS 1.30 06/19/2021 12:28 PM    BASOPHILS 1.30 06/19/2021 12:28 PM      Lab Results   Component Value Date/Time    SODIUM 135 06/19/2021 12:28 PM    POTASSIUM 3.8 06/19/2021 12:28 PM    CHLORIDE 100 06/19/2021 12:28 PM    CO2 24 06/19/2021 12:28 PM    GLUCOSE 104 (H) 06/19/2021 12:28 PM    BUN 7 (L) 06/19/2021 12:28 PM    CREATININE 0.46 (L) 06/19/2021 12:28 PM      No results found for: CHOLSTRLTOT, LDL, HDL, TRIGLYCERIDE    Lab Results   Component Value Date/Time    ALKPHOSPHAT 138 (H) 06/19/2021 12:28 PM    ASTSGOT 34 06/19/2021 12:28 PM    ALTSGPT 66 (H) 06/19/2021 12:28 PM    TBILIRUBIN 0.8 06/19/2021 12:28 PM        Imaging/Testing:    I interpreted and/or reviewed the patient's neuroimaging    CT-ABDOMEN-PELVIS WITH   Final Result      1.  No CT evidence of metastasis      2.  Moderate hepatomegaly with severe steatosis      3.  Lower-outer left breast postoperative seroma favored over abscess or residual mass. Recommend clinical correlation      CT-CEREBRAL PERFUSION ANALYSIS   Final Result      1.  Cerebral blood flow less than 30% likely representing completed infarct = 0 mL.      2.  T Max more than 6 seconds likely representing combination of completed infarct and ischemia = 0 mL.      3.  Mismatched volume likely representing ischemic brain/penumbra = None      4.  Please note that the cerebral perfusion was performed on the limited brain tissue around the basal ganglia region. Infarct/ischemia outside the CT perfusion sections can be missed in this study.      CT-CTA NECK WITH & W/O-POST PROCESSING   Final Result      CT angiogram of the neck within normal limits.      CT-CTA HEAD WITH & W/O-POST PROCESS   Final Result      CT angiogram of the [place] within normal limits.      CT-HEAD W/O   Final Result         1.  Multifocal parenchymal hemorrhage with some surrounding edema is identified in the  right parietal lobe as described above. Differential diagnosis does include parenchymal bleeding related to a brain neoplasm. MRI with and without contrast could be    obtained for further evaluation.      2.  Mild adjacent subarachnoid hemorrhage is also noted.      These findings were discussed with NIKOLAS DICKINSON on 06/19/2021.      DX-CHEST-PORTABLE (1 VIEW)    (Results Pending)       Assessment and Plan:    Pily Buckley is a 46 y.o. female with relevant history of active breast cancer currently undergoing chemotherapy presenting to the hospital for left arm weakness and numbness and neurology was consulted to address possible stroke. NIHSS is 3 significant for left arm weakness with drift, left arm numbness, and extinction with partial neglect of the left. Additionally, patient complains of left lower quadrant abdominal pain 7/10 in severity, and this NP and the ERP noted spasmodic movements of the abdomen. CT head wo contrast showed an intraparenchymal hemorrhage with edema in the right parietal lobe. CTA head and neck w/wo contrast showed no LVO, high-grade stenosis, or aneurysm. She was not a tPA candidate given the right parietal IPH. Differentials in regards to etiology of the hemorrhage include metastatic neoplasm versus AVM versus cortical venous thrombosis.     Plan:    - Admit to ICU for Q1hr neurological assessments and VS  - BP goal 100-140/60-80; control with nicardipine gtt if needed  - Interval CT head wo contrast within 6 hours to monitor IPH  - MRI brain w/wo contrast STAT given venous thrombosis among the ddx  - No significant mass effect. May defer hyperosmolar therapy unless patient has acute neurological decline with consistent with increased ICP.   - Serum Na and Osm q6 hour with goal of eunatremia  - Neurosurgery evaluation  - Keppra 1g IVPB and dexamethasone 10mg IV given in ER per Neurosurgery recommendations.   - HOB at 30 degrees  - Hold all antiplatelets and anticoagulants  - Maintain  pCO2 of 35-40  - Keep euvolemic, euthermic, and normoglycemic (140-180)  - DVT ppx: SCDs    The evaluation of the patient, and recommended management, was discussed with Dr. Valladares, Dr. Lao, and bedside RN.     Franki Mackay, MSN APRN [facility]  Nurse Practitioner, Neurohospitalist  [facility]  Clinical  of Neurology, [facility] [employer]  (t) [phone]

## 2021-06-19 NOTE — ASSESSMENT & PLAN NOTE
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 Patient will need continued AC given concomitant cancer.  Seizure, aspiration, and fall precautions.  Glucose control to maintain <180    HOB at 30 degrees  Keep eunatremia, euvolemic, euthermic, and normoglycemic

## 2021-06-19 NOTE — ASSESSMENT & PLAN NOTE
Possibly related to partial seizure  CT of the abdomen and pelvis with contrast showed no evidence of metastasis, there is moderate hepatomegaly with severe steatosis, left outer breast postop seroma

## 2021-06-19 NOTE — ASSESSMENT & PLAN NOTE
R fronto/parietal IPH thought secondary to superior venous thrombosis  Non surgical management  MRI did not show any other issues  PT/OT consulted  DC on Apixaban  Dw pt's Heme/Onc Dr in [place].  (Dr. Escobar [phone] in [place])

## 2021-06-19 NOTE — ED TRIAGE NOTES
"Chief Complaint   Patient presents with   • Extremity Weakness     LKW 11:55 sudden onset LUE weakness/numbness, LUE drift noted at triage. No other stroke sx noted at triage by neurology   • Abdominal Pain     LLQ pain     Pt BIB EMS for above complaints, started as stroke protocol,  per EMS, pt has hx of breast cancer currently undergoing chemo. VSS on RA, GCS 15, NAD.    Denies all s/sx of covid, denies recent travel, denies fevers.    /84   Pulse 94   Resp 16   Ht 1.549 m (5' 1\")   Wt 71.7 kg (158 lb)   SpO2 94%   BMI 29.85 kg/m²      "

## 2021-06-19 NOTE — ASSESSMENT & PLAN NOTE
-partial seizure like activity observed by neurology and ERP, resolved. Continue Keppra  -CT abdomen obtained in ED showed moderate hepatomegaly and severe steatosis, Left outer breast postop seroma.   -Monitor for reoccurrence.

## 2021-06-19 NOTE — PROGRESS NOTES
1515: Pt transported from [facility] on monitor with ACLS to R100. Orders reviewed, pt belongings home with patient niece    1545: Pt down to MRI     1620: Pt returned to room. Neuro check completed and unchanged

## 2021-06-19 NOTE — ASSESSMENT & PLAN NOTE
On PO chemotherapy for 1 year treated by oncology team in [place].  Left mastectomy Jan 2020-

## 2021-06-19 NOTE — H&P
Hospital Medicine History & Physical Note    Date of Service  6/19/2021    Primary Care Physician  Pcp Pt States None    Consultants  Neurology  Neurosurgery  PMA    Code Status  No Order    Chief Complaint  Chief Complaint   Patient presents with   • Extremity Weakness     LKW 11:55 sudden onset LUE weakness/numbness, LUE drift noted at triage. No other stroke sx noted at triage by neurology   • Abdominal Pain     LLQ pain       History of Presenting Illness  46 y.o. female with past medical history of breast cancer, status post left pneumonectomy, undergoing oral chemotherapy, who presented 6/19/2021 with complaints of suddenly developed left upper extremity weakness and numbness, that started at 1155 this morning when she was playing with her dog.  At the same time she started having shakiness in the left arm and spasms in the left upper abdomen.  She denies other symptoms, including headache, nausea, vomiting, diarrhea, vision changes, speech difficulties, loss of consciousness, balance problems, weakness in the leg.  Her NIH score was 3.  Patient found to have multifocal parenchymal hemorrhage with surrounding edema in the right parietal lobe .  Neurology and neurosurgery consulted by ERP, recommended admission to ICU, strict blood pressure control, Keppra, Decadron.  Patient has not been taking any blood thinners or antiplatelets    Review of Systems  Review of Systems   Constitutional: Negative for chills, fever and weight loss.   HENT: Negative for ear pain, hearing loss and tinnitus.    Eyes: Negative for blurred vision, double vision and photophobia.   Respiratory: Negative for cough, hemoptysis and sputum production.    Cardiovascular: Negative for chest pain, palpitations and orthopnea.   Gastrointestinal: Positive for abdominal pain. Negative for constipation, diarrhea, heartburn, nausea and vomiting.   Genitourinary: Negative for dysuria, flank pain, frequency and hematuria.   Musculoskeletal: Negative  for back pain, joint pain and neck pain.   Skin: Negative for itching and rash.   Neurological: Positive for sensory change, focal weakness and weakness. Negative for tremors, speech change and headaches.   Endo/Heme/Allergies: Negative for environmental allergies and polydipsia. Does not bruise/bleed easily.   Psychiatric/Behavioral: Negative for hallucinations and substance abuse. The patient is not nervous/anxious.        Past Medical History   has a past medical history of Cancer (HCC).    Surgical History   has no past surgical history on file.     Family History  family history is not on file.     Social History   reports that she has never smoked. She does not have any smokeless tobacco history on file.    Allergies  No Known Allergies    Medications  None       Physical Exam  Temp:  [36.6 °C (97.8 °F)] 36.6 °C (97.8 °F)  Pulse:  [] 86  Resp:  [16] 16  BP: (130-136)/(82-84) 130/82  SpO2:  [94 %-97 %] 96 %    Physical Exam  Vitals and nursing note reviewed.   Constitutional:       General: She is not in acute distress.     Appearance: Normal appearance.   HENT:      Head: Normocephalic and atraumatic.      Nose: Nose normal.      Mouth/Throat:      Mouth: Mucous membranes are moist.   Eyes:      Extraocular Movements: Extraocular movements intact.      Pupils: Pupils are equal, round, and reactive to light.   Cardiovascular:      Rate and Rhythm: Normal rate and regular rhythm.   Pulmonary:      Effort: Pulmonary effort is normal.      Breath sounds: Normal breath sounds.   Abdominal:      General: Abdomen is flat. There is no distension.      Tenderness: There is no abdominal tenderness.   Musculoskeletal:         General: No swelling or deformity. Normal range of motion.      Cervical back: Normal range of motion and neck supple.   Skin:     General: Skin is warm and dry.   Neurological:      General: No focal deficit present.      Mental Status: She is alert and oriented to person, place, and time.       Comments: Left upper extremity strength 3 out of 5   Psychiatric:         Mood and Affect: Mood normal.         Behavior: Behavior normal.         Laboratory:  Recent Labs     06/19/21  1228   WBC 4.8   RBC 3.67*   HEMOGLOBIN 13.4   HEMATOCRIT 39.3   .1*   MCH 36.5*   MCHC 34.1   RDW 55.5*   PLATELETCT 265   MPV 9.5     Recent Labs     06/19/21  1228   SODIUM 135   POTASSIUM 3.8   CHLORIDE 100   CO2 24   GLUCOSE 104*   BUN 7*   CREATININE 0.46*   CALCIUM 9.1     Recent Labs     06/19/21  1228   ALTSGPT 66*   ASTSGOT 34   ALKPHOSPHAT 138*   TBILIRUBIN 0.8   GLUCOSE 104*     Recent Labs     06/19/21  1228   APTT 21.7*   INR 1.07     No results for input(s): NTPROBNP in the last 72 hours.      Recent Labs     06/19/21  1228   TROPONINT <6       Imaging:  DX-CHEST-PORTABLE (1 VIEW)   Final Result         No acute cardiac or pulmonary abnormality is identified.      CT-ABDOMEN-PELVIS WITH   Final Result      1.  No CT evidence of metastasis      2.  Moderate hepatomegaly with severe steatosis      3.  Lower-outer left breast postoperative seroma favored over abscess or residual mass. Recommend clinical correlation      CT-CEREBRAL PERFUSION ANALYSIS   Final Result      1.  Cerebral blood flow less than 30% likely representing completed infarct = 0 mL.      2.  T Max more than 6 seconds likely representing combination of completed infarct and ischemia = 0 mL.      3.  Mismatched volume likely representing ischemic brain/penumbra = None      4.  Please note that the cerebral perfusion was performed on the limited brain tissue around the basal ganglia region. Infarct/ischemia outside the CT perfusion sections can be missed in this study.      CT-CTA NECK WITH & W/O-POST PROCESSING   Final Result      CT angiogram of the neck within normal limits.      CT-CTA HEAD WITH & W/O-POST PROCESS   Final Result      CT angiogram of the [place] within normal limits.      CT-HEAD W/O   Final Result         1.   Multifocal parenchymal hemorrhage with some surrounding edema is identified in the right parietal lobe as described above. Differential diagnosis does include parenchymal bleeding related to a brain neoplasm. MRI with and without contrast could be    obtained for further evaluation.      2.  Mild adjacent subarachnoid hemorrhage is also noted.      These findings were discussed with NIKOLAS DICKINSON on 06/19/2021.            Assessment/Plan:  I anticipate this patient will require at least two midnights for appropriate medical management, necessitating inpatient admission.    Intracranial hemorrhage (HCC)  Assessment & Plan  Onset of symptoms 11:55 AM  CT head without contrast: Multifocal parenchymal hemorrhage with some surrounding edema is identified in the right parietal lobe as described above. Differential diagnosis does include parenchymal bleeding related to a brain neoplasm.   CT angiogram of head and neck did not show aneurysm  Pending MRI of the brain with and without contrast  Admission to ICU  Strict blood pressure control with nicardipine gtt if needed  Interval CT head wo contrast within 6 hours to monitor IPH  Serum Na and Osm q6 hour with goal of eunatremia  Keppra 1g IVPB q12hr,  Dexamethasone 10mg IV q6hr   HOB at 30 degrees  SCD for DVT prophylaxis  Keep euvolemic, euthermic, and normoglycemic (140-180)        Abdominal spasms  Assessment & Plan  Possibly related to partial seizure  CT of the abdomen and pelvis with contrast showed no evidence of metastasis, there is moderate hepatomegaly with severe steatosis, left outer breast postop seroma    History of breast cancer  Assessment & Plan  Patient undergoing treatment with oral chemotherapy  She is following with oncology in [place]  Follow-up with oncology as outpatient

## 2021-06-19 NOTE — ED NOTES
Keppra infusing.  Pt c/o numbness & weakness to LE.  Speech clear.  Denies headache.  C/o abd pain/spasms.

## 2021-06-19 NOTE — ED PROVIDER NOTES
"ED Provider Note    CHIEF COMPLAINT  Chief Complaint   Patient presents with   • Extremity Weakness     LKW 11:55 sudden onset LUE weakness/numbness, LUE drift noted at triage. No other stroke sx noted at triage by neurology   • Abdominal Pain     LLQ pain       HPI  Pily Buckley is a 46 y.o. female currently undergoing chemotherapy for breast cancer who presents complaining of left arm weakness and trembling as well as left abdominal pain/trembling.    The symptoms were acute in onset at 11:55 AM.      Patient admits to numbness in her left arm in addition to the weakness and trembling.  She denies a history of similar symptoms.  She denies headache, nausea, vomiting, visual changes, slurred speech, lower extremity symptoms, chest pain, shortness of breath.    Patient is visiting from [place].    No history of diabetes, hypertension, tobacco use, or dyslipidemia.      ALLERGIES  No Known Allergies    CURRENT MEDICATIONS  Home Medications     Reviewed by Jonny Lockhart R.N. (Registered Nurse) on 06/19/21 at 1236  Med List Status: <None>   Medication Last Dose Status        Patient Ralph Taking any Medications                       PAST MEDICAL HISTORY   has a past medical history of Cancer (HCC).  Breast cancer    SURGICAL HISTORY  patient denies any surgical history    SOCIAL HISTORY  Social History     Tobacco Use   • Smoking status: Never Smoker   Substance and Sexual Activity   • Alcohol use: Not on file   • Drug use: Not on file   • Sexual activity: Not on file     Visiting here from [place]  Denies alcohol and drug use    Family Hx:  Denies      REVIEW OF SYSTEMS  See HPI for further details.  All other systems are negative except as above in HPI.    PHYSICAL EXAM  VITAL SIGNS: /82   Pulse 100   Temp 36.6 °C (97.8 °F) (Temporal)   Resp 16   Ht 1.549 m (5' 1\")   Wt 71.7 kg (158 lb)   SpO2 97%   BMI 29.85 kg/m²     General:  WD  female with elevated BMI, nontoxic appearing, slightly " anxious; A+Ox3; V/S as above  Skin: warm and dry; good color; no rash  HEENT: NCAT; EOMs intact; PERRL; no scleral icterus   Neck: FROM; no meningismus, no LAD  Cardiovascular: Regular heart rate and rhythm.  No murmurs, rubs, or gallops; pulses 2+ bilaterally radially and DP areas  Lungs: Clear to auscultation with good air movement bilaterally.  No wheezes, rhonchi, or rales.   Abdomen: BS present; soft; NTND; no rebound, guarding, or rigidity.  No organomegaly or pulsatile mass; intermittent involuntary spasming of the abdominal wall  Extremities: BEATTY x 4; no e/o trauma; no pedal edema; neg Jose F's  Neurologic: CNs III-XII formally intact; speech clear; subjective numbness of the left upper extremity with 3 out of 5 strength compared to 5 out of 5 on the right upper extremity; strength 5/5 UE/LEs;   Psychiatric: Appropriate affect, slightly anxious mood    LABS  Results for orders placed or performed during the hospital encounter of 06/19/21   CBC WITH DIFFERENTIAL   Result Value Ref Range    WBC 4.8 4.8 - 10.8 K/uL    RBC 3.67 (L) 4.20 - 5.40 M/uL    Hemoglobin 13.4 12.0 - 16.0 g/dL    Hematocrit 39.3 37.0 - 47.0 %    .1 (H) 81.4 - 97.8 fL    MCH 36.5 (H) 27.0 - 33.0 pg    MCHC 34.1 33.6 - 35.0 g/dL    RDW 55.5 (H) 35.9 - 50.0 fL    Platelet Count 265 164 - 446 K/uL    MPV 9.5 9.0 - 12.9 fL    Neutrophils-Polys 46.20 44.00 - 72.00 %    Lymphocytes 34.90 22.00 - 41.00 %    Monocytes 15.90 (H) 0.00 - 13.40 %    Eosinophils 1.30 0.00 - 6.90 %    Basophils 1.30 0.00 - 1.80 %    Immature Granulocytes 0.40 0.00 - 0.90 %    Nucleated RBC 0.00 /100 WBC    Neutrophils (Absolute) 2.21 2.00 - 7.15 K/uL    Lymphs (Absolute) 1.67 1.00 - 4.80 K/uL    Monos (Absolute) 0.76 0.00 - 0.85 K/uL    Eos (Absolute) 0.06 0.00 - 0.51 K/uL    Baso (Absolute) 0.06 0.00 - 0.12 K/uL    Immature Granulocytes (abs) 0.02 0.00 - 0.11 K/uL    NRBC (Absolute) 0.00 K/uL   COMP METABOLIC PANEL   Result Value Ref Range    Sodium 135 135 - 145  mmol/L    Potassium 3.8 3.6 - 5.5 mmol/L    Chloride 100 96 - 112 mmol/L    Co2 24 20 - 33 mmol/L    Anion Gap 11.0 7.0 - 16.0    Glucose 104 (H) 65 - 99 mg/dL    Bun 7 (L) 8 - 22 mg/dL    Creatinine 0.46 (L) 0.50 - 1.40 mg/dL    Calcium 9.1 8.5 - 10.5 mg/dL    AST(SGOT) 34 12 - 45 U/L    ALT(SGPT) 66 (H) 2 - 50 U/L    Alkaline Phosphatase 138 (H) 30 - 99 U/L    Total Bilirubin 0.8 0.1 - 1.5 mg/dL    Albumin 4.2 3.2 - 4.9 g/dL    Total Protein 7.3 6.0 - 8.2 g/dL    Globulin 3.1 1.9 - 3.5 g/dL    A-G Ratio 1.4 g/dL   PROTHROMBIN TIME   Result Value Ref Range    PT 13.6 12.0 - 14.6 sec    INR 1.07 0.87 - 1.13   APTT   Result Value Ref Range    APTT 21.7 (L) 24.7 - 36.0 sec   COD (ADULT)   Result Value Ref Range    ABO Grouping Only A     Rh Grouping Only POS     Antibody Screen-Cod NEG    TROPONIN   Result Value Ref Range    Troponin T <6 6 - 19 ng/L   SARS-COV Antigen HESHAM: Collect dry nasal swab AND NP swab in VTM   Result Value Ref Range    SARS-CoV-2 Source Nasal Swab     SARS-COV ANTIGEN HESHAM NotDetected Not-Detected   SARS-CoV-2 PCR (24 hour In-House): Collect NP swab in VTM    Specimen: Respirate   Result Value Ref Range    SARS-CoV-2 Source NP Swab    ESTIMATED GFR   Result Value Ref Range    GFR If African American >60 >60 mL/min/1.73 m 2    GFR If Non African American >60 >60 mL/min/1.73 m 2   Sodium Serum (NA)   Result Value Ref Range    Sodium 137 135 - 145 mmol/L   Platelet Mapping with Basic TEG   Result Value Ref Range    Reaction Time Initial-R 4.5 (L) 5.0 - 10.0 min    Clot Kinetics-K 1.8 1.0 - 3.0 min    Clot Angle-Angle 66.5 53.0 - 72.0 degrees    Maximum Clot Strength-MA 64.2 50.0 - 70.0 mm    Lysis 30 minutes-LY30 0.0 0.0 - 8.0 %    % Inhibition ADP 13.2 %    % Inhibition AA 0.0 %    TEG Algorithm Link Algorithm    Lipid Profile   Result Value Ref Range    Cholesterol,Tot 121 100 - 199 mg/dL    Triglycerides 123 0 - 149 mg/dL    HDL 38 (A) >=40 mg/dL    LDL 58 <100 mg/dL   LIPASE   Result Value Ref  Range    Lipase 24 11 - 82 U/L   EKG (NOW)   Result Value Ref Range    Report       [facility]    Test Date:  2021  Pt Name:    MUNA MONTERROSO                   Department: ER  MRN:        [id number]                      Room:       R100  Gender:     Female                       Technician: STUDENT  :        [id number]                   Requested By:NIKOLAS DICKINSON  Order #:    [id number]                    Reading MD:  NIKOLAS DICKINSON    Measurements  Intervals                                Axis  Rate:       92                           P:          37  DC:         136                          QRS:        29  QRSD:       78                           T:          23  QT:         396  QTc:        490    Interpretive Statements  SINUS RHYTHM  CONSIDER LEFT ATRIAL ABNORMALITY  BORDERLINE PROLONGED QT INTERVAL  No previous ECG available for comparison         IMAGING  CT-ABDOMEN-PELVIS WITH   Final Result      1.  No CT evidence of metastasis      2.  Moderate hepatomegaly with severe steatosis      3.  Lower-outer left breast postoperative seroma favored over abscess or residual mass. Recommend clinical correlation      CT-CEREBRAL PERFUSION ANALYSIS   Final Result      1.  Cerebral blood flow less than 30% likely representing completed infarct = 0 mL.      2.  T Max more than 6 seconds likely representing combination of completed infarct and ischemia = 0 mL.      3.  Mismatched volume likely representing ischemic brain/penumbra = None      4.  Please note that the cerebral perfusion was performed on the limited brain tissue around the basal ganglia region. Infarct/ischemia outside the CT perfusion sections can be missed in this study.      CT-CTA NECK WITH & W/O-POST PROCESSING   Final Result      CT angiogram of the neck within normal limits.      CT-CTA HEAD WITH & W/O-POST PROCESS   Final Result      CT angiogram of the [place] within normal limits.      CT-HEAD W/O    Final Result         1.  Multifocal parenchymal hemorrhage with some surrounding edema is identified in the right parietal lobe as described above. Differential diagnosis does include parenchymal bleeding related to a brain neoplasm. MRI with and without contrast could be    obtained for further evaluation.      2.  Mild adjacent subarachnoid hemorrhage is also noted.      These findings were discussed with NIKOLAS DICKINSON on 06/19/2021.      DX-CHEST-PORTABLE (1 VIEW)    (Results Pending)     MEDICAL RECORD  I have reviewed patient's medical record and pertinent results are listed below.      COURSE & MEDICAL DECISION MAKING  I have reviewed any medical record information, laboratory studies and radiographic results as noted.    Pily Buckley is a 46 y.o. female currently undergoing treatment for breast cancer who presents complaining of left upper extremity numbness and weakness with tremulousness and abdominal spasm/movements that are involuntary.  Symptoms acute in onset at 11:55 AM.    Appropriate PPE was worn at all times while interacting with the patient, including goggles, N95 mask, and surgical mask.    Differential diagnosis includes but is not limited to TIA/CVA, seizure, space-occupying lesion in the brain such as mass or intracranial hemorrhage, electrolyte abnormality.    NIHSS 3    12:46 PM  I received a call from Dr. Mcnamara from radiology who reports a multifocal right parietal hemorrhage that has some vasogenic edema along with a subarachnoid hemorrhage.  Possible AVM versus tumor.    Patient was not a candidate for thrombolytic therapy (tPA) because ICH noted.    Covid ordered.  Keppra ordered as I believe patient may have had seizure activity.  NSG paged    12:49 PM  Neurology NP here who has discussed case with Dr. Valladares.  Recommends BP control, MRI with and without    1:00 PM  I discussed the case with Dr. Balbuena from neurosurgery who recommends Keppra 500 mg twice daily, Decadron milligrams every  6, and ICU overnight.  Paging hospitalist and intensivist.    1:21 PM  Discussed with Dr. Aguilar    I discussed the case with Dr. Ramon who will consult for ICU admission.    The total critical care time on this patient is 35 minutes, resuscitating patient, speaking with admitting physician, and deciphering test results. This 35 minutes is exclusive of separately billable procedures.      FINAL IMPRESSION  1. Left arm weakness     2. Intracranial hemorrhage (HCC)       Electronically signed by: Viridiana Lao M.D., 6/19/2021 12:30 PM

## 2021-06-20 LAB
ABO + RH BLD: NORMAL
ALBUMIN SERPL BCP-MCNC: 3.8 G/DL (ref 3.2–4.9)
ALBUMIN/GLOB SERPL: 1.4 G/DL
ALP SERPL-CCNC: 122 U/L (ref 30–99)
ALT SERPL-CCNC: 65 U/L (ref 2–50)
ANION GAP SERPL CALC-SCNC: 13 MMOL/L (ref 7–16)
AST SERPL-CCNC: 48 U/L (ref 12–45)
BASOPHILS # BLD AUTO: 0 % (ref 0–1.8)
BASOPHILS # BLD: 0 K/UL (ref 0–0.12)
BILIRUB SERPL-MCNC: 0.8 MG/DL (ref 0.1–1.5)
BUN SERPL-MCNC: 6 MG/DL (ref 8–22)
CALCIUM SERPL-MCNC: 8.6 MG/DL (ref 8.5–10.5)
CHLORIDE SERPL-SCNC: 107 MMOL/L (ref 96–112)
CO2 SERPL-SCNC: 18 MMOL/L (ref 20–33)
CREAT SERPL-MCNC: 0.42 MG/DL (ref 0.5–1.4)
EOSINOPHIL # BLD AUTO: 0 K/UL (ref 0–0.51)
EOSINOPHIL NFR BLD: 0 % (ref 0–6.9)
ERYTHROCYTE [DISTWIDTH] IN BLOOD BY AUTOMATED COUNT: 54.9 FL (ref 35.9–50)
GLOBULIN SER CALC-MCNC: 2.8 G/DL (ref 1.9–3.5)
GLUCOSE SERPL-MCNC: 161 MG/DL (ref 65–99)
HCT VFR BLD AUTO: 36.4 % (ref 37–47)
HGB BLD-MCNC: 12.3 G/DL (ref 12–16)
IMM GRANULOCYTES # BLD AUTO: 0.02 K/UL (ref 0–0.11)
IMM GRANULOCYTES NFR BLD AUTO: 0.3 % (ref 0–0.9)
LYMPHOCYTES # BLD AUTO: 0.74 K/UL (ref 1–4.8)
LYMPHOCYTES NFR BLD: 12.8 % (ref 22–41)
MAGNESIUM SERPL-MCNC: 2.3 MG/DL (ref 1.5–2.5)
MCH RBC QN AUTO: 36.1 PG (ref 27–33)
MCHC RBC AUTO-ENTMCNC: 33.8 G/DL (ref 33.6–35)
MCV RBC AUTO: 106.7 FL (ref 81.4–97.8)
MONOCYTES # BLD AUTO: 0.17 K/UL (ref 0–0.85)
MONOCYTES NFR BLD AUTO: 3 % (ref 0–13.4)
NEUTROPHILS # BLD AUTO: 4.83 K/UL (ref 2–7.15)
NEUTROPHILS NFR BLD: 83.9 % (ref 44–72)
NRBC # BLD AUTO: 0 K/UL
NRBC BLD-RTO: 0 /100 WBC
PLATELET # BLD AUTO: 255 K/UL (ref 164–446)
PMV BLD AUTO: 9.7 FL (ref 9–12.9)
POTASSIUM SERPL-SCNC: 4 MMOL/L (ref 3.6–5.5)
PROT SERPL-MCNC: 6.6 G/DL (ref 6–8.2)
RBC # BLD AUTO: 3.41 M/UL (ref 4.2–5.4)
SARS-COV-2 RNA RESP QL NAA+PROBE: NOTDETECTED
SODIUM SERPL-SCNC: 138 MMOL/L (ref 135–145)
SODIUM SERPL-SCNC: 138 MMOL/L (ref 135–145)
SODIUM SERPL-SCNC: 139 MMOL/L (ref 135–145)
SPECIMEN SOURCE: NORMAL
WBC # BLD AUTO: 5.8 K/UL (ref 4.8–10.8)

## 2021-06-20 PROCEDURE — 99291 CRITICAL CARE FIRST HOUR: CPT | Performed by: NURSE PRACTITIONER

## 2021-06-20 PROCEDURE — 700102 HCHG RX REV CODE 250 W/ 637 OVERRIDE(OP): Performed by: INTERNAL MEDICINE

## 2021-06-20 PROCEDURE — 700111 HCHG RX REV CODE 636 W/ 250 OVERRIDE (IP): Performed by: PSYCHIATRY & NEUROLOGY

## 2021-06-20 PROCEDURE — 770022 HCHG ROOM/CARE - ICU (200)

## 2021-06-20 PROCEDURE — 700111 HCHG RX REV CODE 636 W/ 250 OVERRIDE (IP): Performed by: STUDENT IN AN ORGANIZED HEALTH CARE EDUCATION/TRAINING PROGRAM

## 2021-06-20 PROCEDURE — 84295 ASSAY OF SERUM SODIUM: CPT | Mod: 91

## 2021-06-20 PROCEDURE — 85025 COMPLETE CBC W/AUTO DIFF WBC: CPT

## 2021-06-20 PROCEDURE — 700111 HCHG RX REV CODE 636 W/ 250 OVERRIDE (IP): Performed by: INTERNAL MEDICINE

## 2021-06-20 PROCEDURE — 99233 SBSQ HOSP IP/OBS HIGH 50: CPT | Performed by: NURSE PRACTITIONER

## 2021-06-20 PROCEDURE — A9270 NON-COVERED ITEM OR SERVICE: HCPCS | Performed by: INTERNAL MEDICINE

## 2021-06-20 PROCEDURE — 83735 ASSAY OF MAGNESIUM: CPT

## 2021-06-20 PROCEDURE — 80053 COMPREHEN METABOLIC PANEL: CPT

## 2021-06-20 RX ORDER — LEVETIRACETAM 100 MG/ML
500 SOLUTION ORAL EVERY 12 HOURS
Status: DISCONTINUED | OUTPATIENT
Start: 2021-06-20 | End: 2021-06-23 | Stop reason: HOSPADM

## 2021-06-20 RX ADMIN — DEXAMETHASONE SODIUM PHOSPHATE 4 MG: 4 INJECTION, SOLUTION INTRA-ARTICULAR; INTRALESIONAL; INTRAMUSCULAR; INTRAVENOUS; SOFT TISSUE at 00:00

## 2021-06-20 RX ADMIN — DEXAMETHASONE SODIUM PHOSPHATE 4 MG: 4 INJECTION, SOLUTION INTRA-ARTICULAR; INTRALESIONAL; INTRAMUSCULAR; INTRAVENOUS; SOFT TISSUE at 05:52

## 2021-06-20 RX ADMIN — ENOXAPARIN SODIUM 80 MG: 80 INJECTION SUBCUTANEOUS at 17:25

## 2021-06-20 RX ADMIN — LEVETIRACETAM INJECTION 500 MG: 5 INJECTION INTRAVENOUS at 05:44

## 2021-06-20 RX ADMIN — ENOXAPARIN SODIUM 80 MG: 80 INJECTION SUBCUTANEOUS at 05:52

## 2021-06-20 RX ADMIN — LEVETIRACETAM 500 MG: 100 SOLUTION ORAL at 17:25

## 2021-06-20 RX ADMIN — FAMOTIDINE 20 MG: 10 INJECTION INTRAVENOUS at 05:53

## 2021-06-20 ASSESSMENT — LIFESTYLE VARIABLES
AVERAGE NUMBER OF DAYS PER WEEK YOU HAVE A DRINK CONTAINING ALCOHOL: 0
HAVE PEOPLE ANNOYED YOU BY CRITICIZING YOUR DRINKING: NO
TOTAL SCORE: 0
DOES PATIENT WANT TO STOP DRINKING: NO
TOTAL SCORE: 0
EVER HAD A DRINK FIRST THING IN THE MORNING TO STEADY YOUR NERVES TO GET RID OF A HANGOVER: NO
CONSUMPTION TOTAL: NEGATIVE
ON A TYPICAL DAY WHEN YOU DRINK ALCOHOL HOW MANY DRINKS DO YOU HAVE: 0
EVER FELT BAD OR GUILTY ABOUT YOUR DRINKING: NO
TOTAL SCORE: 0
HOW MANY TIMES IN THE PAST YEAR HAVE YOU HAD 5 OR MORE DRINKS IN A DAY: 0
HAVE YOU EVER FELT YOU SHOULD CUT DOWN ON YOUR DRINKING: NO

## 2021-06-20 ASSESSMENT — COGNITIVE AND FUNCTIONAL STATUS - GENERAL
TOILETING: A LITTLE
MOVING FROM LYING ON BACK TO SITTING ON SIDE OF FLAT BED: A LITTLE
DAILY ACTIVITIY SCORE: 18
MOBILITY SCORE: 19
HELP NEEDED FOR BATHING: A LITTLE
WALKING IN HOSPITAL ROOM: A LITTLE
DRESSING REGULAR LOWER BODY CLOTHING: A LITTLE
CLIMB 3 TO 5 STEPS WITH RAILING: A LITTLE
SUGGESTED CMS G CODE MODIFIER MOBILITY: CK
SUGGESTED CMS G CODE MODIFIER DAILY ACTIVITY: CK
STANDING UP FROM CHAIR USING ARMS: A LITTLE
DRESSING REGULAR UPPER BODY CLOTHING: A LITTLE
EATING MEALS: A LITTLE
PERSONAL GROOMING: A LITTLE
MOVING TO AND FROM BED TO CHAIR: A LITTLE

## 2021-06-20 ASSESSMENT — PATIENT HEALTH QUESTIONNAIRE - PHQ9
2. FEELING DOWN, DEPRESSED, IRRITABLE, OR HOPELESS: NOT AT ALL
2. FEELING DOWN, DEPRESSED, IRRITABLE, OR HOPELESS: NOT AT ALL
1. LITTLE INTEREST OR PLEASURE IN DOING THINGS: NOT AT ALL
1. LITTLE INTEREST OR PLEASURE IN DOING THINGS: NOT AT ALL
SUM OF ALL RESPONSES TO PHQ9 QUESTIONS 1 AND 2: 0
SUM OF ALL RESPONSES TO PHQ9 QUESTIONS 1 AND 2: 0

## 2021-06-20 ASSESSMENT — ENCOUNTER SYMPTOMS
MYALGIAS: 0
TINGLING: 0
DIZZINESS: 0
HEMOPTYSIS: 0
DOUBLE VISION: 0
WEAKNESS: 0
TREMORS: 0
VOMITING: 0
SPEECH CHANGE: 0
LOSS OF CONSCIOUSNESS: 0
COUGH: 0
FEVER: 0
SENSORY CHANGE: 1
ABDOMINAL PAIN: 0
FOCAL WEAKNESS: 0
CHILLS: 0
HEARTBURN: 0
PALPITATIONS: 0
SEIZURES: 0
NAUSEA: 0
BLURRED VISION: 0
HEADACHES: 0

## 2021-06-20 NOTE — HOSPITAL COURSE
46 y.o. female undergoing treatment for breast cancer on oral chemotherapy for the past year, s/p left mastectomy who presented 6/19/2021 after suddenly developing LE weakness, with numbness that started at 1155AM on 6/20. She states that she has felt fatigued for the past month however denies head-ache,dyspnia, chest pain, facial droop, weakness or confusion.  NIH score 3 on arrival of [facility]  code stroke activated, CTH w/out contrast showed an intraparenchymal hemorrhage with edema in the right parietal lobe. She was not a tPA   candidate due to right IPH. CTA head and neck w/wo contrast showed no LVO, high-grade stenosis, or aneurysm; however, it did reveal enhancement of the superior sagittal sinus vein concerning for central venous thrombosis. 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

## 2021-06-20 NOTE — PROGRESS NOTES
Neurosurgery Progress Note    Subjective:  No events    Exam:    A&O x3, GCS 15  PERRL, EOMI  Face symm, tongue midline  BEATTY with FS, no drift      BP  Min: 93/55  Max: 137/77  Pulse  Av.2  Min: 72  Max: 106  Resp  Av.3  Min: 14  Max: 30  Temp  Av.3 °C (97.3 °F)  Min: 36.1 °C (97 °F)  Max: 36.6 °C (97.8 °F)  SpO2  Av %  Min: 93 %  Max: 97 %    No data recorded    Recent Labs     21  1228 21  021   WBC 4.8 5.8   RBC 3.67* 3.41*   HEMOGLOBIN 13.4 12.3   HEMATOCRIT 39.3 36.4*   .1* 106.7*   MCH 36.5* 36.1*   MCHC 34.1 33.8   RDW 55.5* 54.9*   PLATELETCT 265 255   MPV 9.5 9.7     Recent Labs     21  1228 21  1228 21  1400 21   SODIUM 135   < > 137 140 138   POTASSIUM 3.8  --   --   --  4.0   CHLORIDE 100  --   --   --  107   CO2 24  --   --   --  18*   GLUCOSE 104*  --   --   --  161*   BUN 7*  --   --   --  6*   CREATININE 0.46*  --   --   --  0.42*   CALCIUM 9.1  --   --   --  8.6    < > = values in this interval not displayed.     Recent Labs     21  122   APTT 21.7*   INR 1.07     Recent Labs     21  1400   REACTMIN 4.5*   CLOTKINET 1.8   CLOTANGL 66.5   MAXCLOTS 64.2   [id number] 0.0   PRCINADP 13.2   PRCINAA 0.0       Intake/Output                       21 - 2159 21 - 21 3417-1856 Total 8653-4570 3582-0634 Total                 Intake    P.O.  --  630 630  --  -- --    P.O. -- 630 630 -- -- --    I.V.  --  1058.7 1058.7  --  -- --    Volume (mL) (NS infusion) -- 1058.7 1058.7 -- -- --    IV Piggyback  100  -- 100  --  -- --    Volume (mL) (levETIRAcetam (Keppra) 1000 mg in 100 mL NaCl IV premix) 100 -- 100 -- -- --    Total Intake 100 1688.7 1788.7 -- -- --       Output    Urine  500  650 1150  --  -- --    Number of Times Voided 1 x 1 x 2 x -- -- --    Urine Void (mL)  -- -- --    Stool  --  -- --  --  -- --    Number of Times Stooled -- 0 x 0 x -- --  --    Total Output  -- -- --       Net I/O     -400 1038.7 638.7 -- -- --            Intake/Output Summary (Last 24 hours) at 6/20/2021 1017  Last data filed at 6/20/2021 0600  Gross per 24 hour   Intake 1788.67 ml   Output 1150 ml   Net 638.67 ml            • levETIRAcetam  500 mg Q12HRS   • senna-docusate  2 tablet BID    And   • polyethylene glycol/lytes  1 Packet QDAY PRN    And   • magnesium hydroxide  30 mL QDAY PRN    And   • bisacodyl  10 mg QDAY PRN   • Respiratory Therapy Consult   Continuous RT   • LORazepam  2 mg Q5 MIN PRN   • ondansetron  4 mg Q4HRS PRN    Or   • ondansetron  4 mg Q4HRS PRN   • MD Alert...Adult ICU Electrolyte Replacement per Pharmacy   PHARMACY TO DOSE   • promethazine  12.5-25 mg Q4HRS PRN   • promethazine  12.5-25 mg Q4HRS PRN   • prochlorperazine  5-10 mg Q4HRS PRN   • niCARdipine infusion  0-15 mg/hr Continuous   • acetaminophen  650 mg Q4HRS PRN    Or   • acetaminophen  650 mg Q4HRS PRN   • labetalol  10 mg Q4HRS PRN   • hydrALAZINE  10 mg Q4HRS PRN   • enalaprilat  1.25 mg Q6HRS PRN   • enoxaparin (LOVENOX) injection  1 mg/kg Q12HRS       Assessment and Plan:  Hospital day # 2 right parietal ich  POD# n/a  Exam improved  MRV pending- unclear if there is a SSS thrombus  Continue keppra  D/c decadron

## 2021-06-20 NOTE — CARE PLAN
The patient is Stable - Low risk of patient condition declining or worsening      Problem: Pain - Standard  Goal: Alleviation of pain or a reduction in pain to the patient’s comfort goal  Outcome: Progressing     Problem: Knowledge Deficit - Standard  Goal: Patient and family/care givers will demonstrate understanding of plan of care, disease process/condition, diagnostic tests and medications  Outcome: Progressing     Problem: Skin Integrity  Goal: Skin integrity is maintained or improved  Outcome: Progressing     Problem: Neuro Status  Goal: Neuro status will remain stable or improve  Outcome: Progressing     Problem: Self Care  Goal: Patient will have the ability to perform ADLs independently or with assistance (bathe, groom, dress, toilet and feed)  Outcome: Progressing     Problem: Risk for Aspiration  Goal: Patient's risk for aspiration will be absent or decrease  Outcome: Progressing     Problem: Urinary Elimination  Goal: Establish and maintain regular urinary output  Outcome: Progressing     Problem: Bowel Elimination  Goal: Establish and maintain regular bowel function  Outcome: Progressing     Problem: Respiratory  Goal: Patient will achieve/maintain optimum respiratory ventilation and gas exchange  Outcome: Progressing     Problem: Mobility  Goal: Patient's capacity to carry out activities will improve  Outcome: Progressing     Problem: Psychosocial  Goal: Patient's level of anxiety will decrease  Outcome: Progressing  Goal: Patient's ability to verbalize feelings about condition will improve  Outcome: Progressing  Goal: Patient's ability to re-evaluate and adapt role responsibilities will improve  Outcome: Progressing  Goal: Patient and family will demonstrate ability to cope with life altering diagnosis and/or procedure  Outcome: Progressing  Goal: Spiritual and cultural needs incorporated into hospitalization  Outcome: Progressing     Problem: Hemodynamics  Goal: Patient's hemodynamics, fluid balance  and neurologic status will be stable or improve  Outcome: Progressing     Problem: Urinary - Renal Perfusion  Goal: Ability to achieve and maintain adequate renal perfusion and functioning will improve  Outcome: Progressing     Problem: Venous Thromboembolism (VTE) Prevention  Goal: The patient will remain free from venous thromboembolism (VTE)  Outcome: Progressing     Problem: Nutrition  Goal: Patient's nutritional and fluid intake will be adequate or improve  Outcome: Progressing  Goal: Enteral nutrition will be maintained or improve  Outcome: Progressing  Goal: Enteral nutrition will be maintained or improve  Outcome: Progressing     Problem: Nutrition  Goal: Patient's nutritional and fluid intake will be adequate or improve  Outcome: Progressing  Goal: Enteral nutrition will be maintained or improve  Outcome: Progressing  Goal: Enteral nutrition will be maintained or improve  Outcome: Progressing       Shift Goals  Clinical Goals: Stable Neuro Exam  Patient Goals: Improved strength/decreased numbness/comfort    Progress made toward(s) clinical / shift goals:      Patient is not progressing towards the following goals:

## 2021-06-20 NOTE — CARE PLAN
The patient is Watcher - Medium risk of patient condition declining or worsening    Shift Goals  Clinical Goals: Stable Neuro Exam  Patient Goals: Improved strength/decreased numbness/comfort    Progress made toward(s) clinical / shift goals:    Problem: Knowledge Deficit - Standard  Goal: Patient and family/care givers will demonstrate understanding of plan of care, disease process/condition, diagnostic tests and medications  Outcome: Progressing     Problem: Neuro Status  Goal: Neuro status will remain stable or improve  Outcome: Progressing     Problem: Self Care  Goal: Patient will have the ability to perform ADLs independently or with assistance (bathe, groom, dress, toilet and feed)  Outcome: Progressing     Problem: Risk for Aspiration  Goal: Patient's risk for aspiration will be absent or decrease  Outcome: Progressing     Problem: Psychosocial  Goal: Patient's ability to verbalize feelings about condition will improve  Outcome: Progressing  Goal: Spiritual and cultural needs incorporated into hospitalization  Outcome: Progressing     Problem: Hemodynamics  Goal: Patient's hemodynamics, fluid balance and neurologic status will be stable or improve  Outcome: Progressing     Problem: Nutrition  Goal: Patient's nutritional and fluid intake will be adequate or improve  Outcome: Progressing       Patient is not progressing towards the following goals:

## 2021-06-20 NOTE — PROGRESS NOTES
Neurology Progress Note  Neurohospitalist Service, [facility]    Referring Physician: Gavin Hathaway M.D.    Chief Complaint   Patient presents with   • Extremity Weakness     LKW 11:55 sudden onset LUE weakness/numbness, LUE drift noted at triage. No other stroke sx noted at triage by neurology   • Abdominal Pain     LLQ pain       HPI: Refer to initial documented Neurology H&P, as detailed in the patient's chart.    Interval History 6/20/2021: No acute events overnight. Patient currently sitting up in bed, awake, alert, fully oriented, and following commands. She reports resolution of her left arm weakness and numbness following initiation of  therapeutic Lovenox. Reports some spontaneous movements of her left arm, not seen on exam. Denies headache, vision changes, facial weakness, change in speech or language, memory loss, loss of consciousness, or episodes of confusion.       Past Medical History:   Past Medical History:   Diagnosis Date   • Cancer (HCC)     breast        FHx:  History reviewed. No pertinent family history.     SHx:  Social History     Socioeconomic History   • Marital status:      Spouse name: Not on file   • Number of children: Not on file   • Years of education: Not on file   • Highest education level: Not on file   Occupational History   • Not on file   Tobacco Use   • Smoking status: Never Smoker   Substance and Sexual Activity   • Alcohol use: Not on file   • Drug use: Not on file   • Sexual activity: Not on file   Other Topics Concern   • Not on file   Social History Narrative   • Not on file     Social Determinants of Health     Financial Resource Strain:    • Difficulty of Paying Living Expenses:    Food Insecurity:    • Worried About Running Out of Food in the Last Year:    • Ran Out of Food in the Last Year:    Transportation Needs:    • Lack of Transportation (Medical):    • Lack of Transportation (Non-Medical):    Physical Activity:    • Days of  Exercise per Week:    • Minutes of Exercise per Session:    Stress:    • Feeling of Stress :    Social Connections:    • Frequency of Communication with Friends and Family:    • Frequency of Social Gatherings with Friends and Family:    • Attends Jainism Services:    • Active Member of Clubs or Organizations:    • Attends Club or Organization Meetings:    • Marital Status:    Intimate Partner Violence:    • Fear of Current or Ex-Partner:    • Emotionally Abused:    • Physically Abused:    • Sexually Abused:         Medications:    Current Facility-Administered Medications:   •  senna-docusate (PERICOLACE or SENOKOT S) 8.6-50 MG per tablet 2 tablet, 2 tablet, Oral, BID **AND** polyethylene glycol/lytes (MIRALAX) PACKET 1 Packet, 1 Packet, Oral, QDAY PRN **AND** magnesium hydroxide (MILK OF MAGNESIA) suspension 30 mL, 30 mL, Oral, QDAY PRN **AND** bisacodyl (DULCOLAX) suppository 10 mg, 10 mg, Rectal, QDAY PRN, Chris Rajan M.D.  •  Respiratory Therapy Consult, , Nebulization, Continuous RT, Chris Rajan M.D.  •  LORazepam (ATIVAN) injection 2 mg, 2 mg, Intravenous, Q5 MIN PRN, Chris Rajan M.D.  •  ondansetron (ZOFRAN ODT) dispertab 4 mg, 4 mg, Oral, Q4HRS PRN **OR** ondansetron (ZOFRAN) syringe/vial injection 4 mg, 4 mg, Intravenous, Q4HRS PRN, Chris Rajan M.D.  •  MD Alert...ICU Electrolyte Replacement per Pharmacy, , Other, PHARMACY TO DOSE, Chris Rajan M.D.  •  promethazine (PHENERGAN) tablet 12.5-25 mg, 12.5-25 mg, Oral, Q4HRS PRN, Chris Rajan M.D.  •  promethazine (PHENERGAN) suppository 12.5-25 mg, 12.5-25 mg, Rectal, Q4HRS PRN, Chris Rajan M.D.  •  prochlorperazine (COMPAZINE) injection 5-10 mg, 5-10 mg, Intravenous, Q4HRS PRN, Chris Rajan M.D.  •  niCARdipine (CARDENE) 25 mg in  mL Infusion, 0-15 mg/hr, Intravenous, Continuous, Chris Rajan M.D., Held at 06/19/21 1400  •  acetaminophen (Tylenol) tablet 650 mg, 650 mg, Oral, Q4HRS PRN **OR**  acetaminophen (TYLENOL) suppository 650 mg, 650 mg, Rectal, Q4HRS PRN, Sal Arndt M.D.  •  labetalol (NORMODYNE/TRANDATE) injection 10 mg, 10 mg, Intravenous, Q4HRS PRN, aSl Arndt M.D.  •  hydrALAZINE (APRESOLINE) injection 10 mg, 10 mg, Intravenous, Q4HRS PRN, Sal Arndt M.D.  •  enalaprilat (VASOTEC) injection 1.25 mg, 1.25 mg, Intravenous, Q6HRS PRN, Sal Arndt M.D.  •  enoxaparin (LOVENOX) inj 80 mg, 1 mg/kg, Subcutaneous, Q12HRS, Franki Valladares M.D., 80 mg at 06/20/21 0552    Allergies:  No Known Allergies     Review of systems: In addition to what is detailed in the interval history above, all other systems reviewed and are negative.      Physical Examination:   Vitals:    06/20/21 0500 06/20/21 0600 06/20/21 0700 06/20/21 0800   BP:  119/68 106/58 119/72   Pulse: (!) 104 72 76 74   Resp: (!) 30 (!) 23 18 18   Temp:  36.2 °C (97.1 °F)  36.4 °C (97.5 °F)   TempSrc:  Temporal  Temporal   SpO2: 93% 95% 95% 95%   Weight:       Height:         General: Patient in no acute distress, pleasant and cooperative.  HEENT: Normocephalic, no signs of acute trauma.   Neck: Supple, no meningeal signs or carotid bruits. There is normal range of motion. No tenderness on exam.   Chest: Regular and unlabored breaths. No cough.   CV: RRR.   Skin: No signs of acute rashes or trauma.   Musculoskeletal: Joints exhibit full range of motion, without any pain to palpation. There are no signs of joint or muscle swelling. There is no tenderness to deep palpation of muscles.   Psychiatric: No hallucinatory behavior. Denies symptoms of depression or suicidal ideation. Mood and affect appear normal on exam.     NEUROLOGICAL EXAM:   Mental status, orientation: Awake, alert, following commands, and fully oriented.   Speech and language: Speech is clear and fluent. The patient is able to name, repeat, and comprehend.   Memory: There is intact recollection of recent and remote events.   Cranial nerve exam: Pupils are  3-4 mm bilaterally and equally reactive to light. Visual fields are full to field test. There is no nystagmus on primary or secondary gaze. Intact full EOM in all directions of gaze. Face appears symmetric. Sensation in the face is intact to light touch. Uvula is midline. Palate elevates symmetrically. Tongue is midline and without any signs of tongue biting or fasciculations. Sternocleidomastoid muscles exhibit is normal strength bilaterally. Shoulder shrug is intact bilaterally.   Motor exam: Strength is 5/5 in all extremities. Tone is normal. No abnormal movements were seen on exam.   Sensory exam Reveals normal sense of light touch in all extremities.   Deep tendon reflexes:  2+ throughout. Plantar responses are flexor. There is no clonus.   Coordination: No ataxia with a normal finger-nose-finger and heel-down-shin. Normal rapidly alternating movements.   Gait: Deferred per patient preference.       Ancillary Data Reviewed:    Labs:  Lab Results   Component Value Date/Time    PROTHROMBTM 13.6 06/19/2021 12:28 PM    INR 1.07 06/19/2021 12:28 PM      Lab Results   Component Value Date/Time    WBC 5.8 06/20/2021 02:12 AM    RBC 3.41 (L) 06/20/2021 02:12 AM    HEMOGLOBIN 12.3 06/20/2021 02:12 AM    HEMATOCRIT 36.4 (L) 06/20/2021 02:12 AM    .7 (H) 06/20/2021 02:12 AM    MCH 36.1 (H) 06/20/2021 02:12 AM    MCHC 33.8 06/20/2021 02:12 AM    MPV 9.7 06/20/2021 02:12 AM    NEUTSPOLYS 83.90 (H) 06/20/2021 02:12 AM    LYMPHOCYTES 12.80 (L) 06/20/2021 02:12 AM    MONOCYTES 3.00 06/20/2021 02:12 AM    EOSINOPHILS 0.00 06/20/2021 02:12 AM    BASOPHILS 0.00 06/20/2021 02:12 AM      Lab Results   Component Value Date/Time    SODIUM 138 06/20/2021 02:12 AM    POTASSIUM 4.0 06/20/2021 02:12 AM    CHLORIDE 107 06/20/2021 02:12 AM    CO2 18 (L) 06/20/2021 02:12 AM    GLUCOSE 161 (H) 06/20/2021 02:12 AM    BUN 6 (L) 06/20/2021 02:12 AM    CREATININE 0.42 (L) 06/20/2021 02:12 AM      Lab Results   Component Value Date/Time     CHOLSTRLTOT 121 06/19/2021 02:00 PM    LDL 58 06/19/2021 02:00 PM    HDL 38 (A) 06/19/2021 02:00 PM    TRIGLYCERIDE 123 06/19/2021 02:00 PM       Lab Results   Component Value Date/Time    ALKPHOSPHAT 122 (H) 06/20/2021 02:12 AM    ASTSGOT 48 (H) 06/20/2021 02:12 AM    ALTSGPT 65 (H) 06/20/2021 02:12 AM    TBILIRUBIN 0.8 06/20/2021 02:12 AM        Imaging/Testing:    I interpreted and/or reviewed the patient's neuroimaging    CT-HEAD W/O   Final Result      No significant change in intraparenchymal hemorrhage. Small amount of subarachnoid hemorrhage is more pronounced than on the prior exam. No midline shift         MR-BRAIN-WITH & W/O   Final Result      1.  RIGHT frontoparietal parenchymal hemorrhage without evidence of underlying mass or vascular malformation. This appears slightly larger than on the CT.   2.   Small amount of RIGHT supratentorial subarachnoid blood   3.  No herniation      DX-CHEST-PORTABLE (1 VIEW)   Final Result         No acute cardiac or pulmonary abnormality is identified.      CT-ABDOMEN-PELVIS WITH   Final Result      1.  No CT evidence of metastasis      2.  Moderate hepatomegaly with severe steatosis      3.  Lower-outer left breast postoperative seroma favored over abscess or residual mass. Recommend clinical correlation      CT-CEREBRAL PERFUSION ANALYSIS   Final Result      1.  Cerebral blood flow less than 30% likely representing completed infarct = 0 mL.      2.  T Max more than 6 seconds likely representing combination of completed infarct and ischemia = 0 mL.      3.  Mismatched volume likely representing ischemic brain/penumbra = None      4.  Please note that the cerebral perfusion was performed on the limited brain tissue around the basal ganglia region. Infarct/ischemia outside the CT perfusion sections can be missed in this study.      CT-CTA NECK WITH & W/O-POST PROCESSING   Final Result      CT angiogram of the neck within normal limits.      CT-CTA HEAD WITH & W/O-POST  PROCESS   Final Result      CT angiogram of the [place] within normal limits.      CT-HEAD W/O   Final Result         1.  Multifocal parenchymal hemorrhage with some surrounding edema is identified in the right parietal lobe as described above. Differential diagnosis does include parenchymal bleeding related to a brain neoplasm. MRI with and without contrast could be    obtained for further evaluation.      2.  Mild adjacent subarachnoid hemorrhage is also noted.      These findings were discussed with NIKOLAS DICKINSON on 06/19/2021.      MR-VENOGRAM (MRV) HEAD    (Results Pending)       Assessment and Plan:    Pily Buckley is a 46 y.o. female with relevant history of active breast cancer currently undergoing chemotherapy presenting to the hospital for left arm weakness and numbness and neurology was consulted to address possible stroke. NIHSS initially was 3 significant for left arm weakness with drift, left arm numbness, and extinction with partial neglect of the left. Additionally, patient complained of left lower quadrant abdominal pain 7/10 in severity, and this NP and the ERP noted spasmodic movements of the abdomen. CT head wo contrast showed an intraparenchymal hemorrhage with edema in the right parietal lobe. She was not a tPA candidate given the right parietal IPH. CTA head and neck w/wo contrast showed no LVO, high-grade stenosis, or aneurysm; however, did reveal enhancement of the superior sagittal sinus vein concerning for central venous thrombosis. 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 Additionally, there was no evidence of underlying mass or vascular malformation. Patient was started on therapeutic enoxaparin 1mg/kg q12hr. Etiology is likely hypercoagulable state in the settinf of breast cancer. Interval CT head wo contrast 6/19/21 22:00 showed no  significant change of the right parietal IPH and no midline shift. Neurological exam today has returned to baseline with resolution of her left arm hemiparesis and numbness with no evidence of hemisensory neglect.     Plan:    - May decrease to Q2hr neurological assessments and VS   - BP goal 100-140/60-80  - Continue enoxaparin 80mg SQ q12hr. Patient will need continued AC given concomitant cancer.   - HOB at 30 degrees  - Maintain pCO2 of 35-40  - Keep eunatremia, euvolemic, euthermic, and normoglycemic (140-180)  - DVT ppx: SCDs and enoxaparin    The evaluation of the patient, and recommended management, was discussed with Dr. Valladares, Dr. Hathaway, and bedside RN. I have performed a physical exam and reviewed and updated ROS and Plan today (6/20/2021). In review of yesterday's note (6/19/2021), there are no changes except as documented above.    FELIBERTO KumariRCarleyN.   Nurse Practitioner, Neurohospitalist  [facility]  t) [phone] (f) [phone]

## 2021-06-20 NOTE — PROGRESS NOTES
Spoke with MRI Radiology: per Oniel OROURKE pending. Must wait for 24 hrs s/p previous MRI scan due to contrast administration before next scan. Contrast should be out of her system before.   Will schedule when appropriate.

## 2021-06-20 NOTE — PROGRESS NOTES
Critical Care Progress Note    Date of admission  6/19/2021    Chief Complaint  46 y.o. female admitted 6/19/2021 with sudden LUE weakness/numbness    Hospital Course  46 y.o. female undergoing treatment for breast cancer on oral chemotherapy for the past year, s/p left mastectomy who presented 6/19/2021 after suddenly developing LE weakness, with numbness that started at 1155AM on 6/20. She states that she has felt fatigued for the past month however denies head-ache,dyspnia, chest pain, facial droop, weakness or confusion.  NIH score 3 on arrival of [facility]  code stroke activated, CTH w/out contrast showed an intraparenchymal hemorrhage with edema in the right parietal lobe. She was not a tPA   candidate due to right IPH. CTA head and neck w/wo contrast showed no LVO, high-grade stenosis, or aneurysm; however, it did reveal enhancement of the superior sagittal sinus vein concerning for central venous thrombosis. 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       Interval Problem Update  Reviewed last 24 hour events:  After further discussion and review of films  with radiology and neurology - Films are consistent with midsegment flow void with vasogenic edema and right parietal IPH due to central venous thrombosis of the superior sagittal sinus vein without evidence of underlying mass or vascular malformation. Radiology (KATHY Torres) and neurology agree that MRV would not be helpful  Patient  started on therapeutic enoxaparin 1mg/kg q12hr. Etiology is likely hypercoagulable state 2nd to  breast cancer. Interval CT       Review of Systems  Review of Systems   Constitutional: Negative for chills and fever.   HENT: Negative for hearing loss.    Eyes: Negative for blurred vision and double vision.   Respiratory: Negative for  cough and hemoptysis.    Cardiovascular: Negative for chest pain and palpitations.   Gastrointestinal: Negative for abdominal pain, heartburn, nausea and vomiting.   Musculoskeletal: Negative for myalgias.   Skin: Negative for rash.   Neurological: Positive for sensory change. Negative for dizziness, tingling, tremors, speech change, focal weakness, seizures, loss of consciousness, weakness and headaches.        Occasional sensation of left arm falling asleep        Vital Signs for last 24 hours   Temp:  [35.9 °C (96.7 °F)-36.4 °C (97.5 °F)] 35.9 °C (96.7 °F)  Pulse:  [] 83  Resp:  [14-50] 24  BP: ()/(55-82) 129/70  SpO2:  [93 %-97 %] 95 %    Hemodynamic parameters for last 24 hours       Respiratory Information for the last 24 hours       Physical Exam   Physical Exam  Vitals and nursing note reviewed.   Constitutional:       Appearance: Normal appearance.   HENT:      Head: Normocephalic and atraumatic.      Nose: Nose normal.      Mouth/Throat:      Mouth: Mucous membranes are moist.   Eyes:      General: No visual field deficit.     Extraocular Movements: Extraocular movements intact.      Pupils: Pupils are equal, round, and reactive to light.   Cardiovascular:      Rate and Rhythm: Normal rate and regular rhythm.      Heart sounds: No murmur heard.     Pulmonary:      Effort: Pulmonary effort is normal.      Breath sounds: Normal breath sounds.   Chest:          Comments: Mastectomy healed scar  Port a cath scar left upper chest well healed  Port a cath site right upper chest wall- port a cath palpated   Abdominal:      General: Abdomen is flat. Bowel sounds are normal.      Palpations: Abdomen is soft.   Musculoskeletal:         General: No swelling, tenderness or deformity.      Cervical back: Normal range of motion and neck supple.      Comments: Good tone   Skin:     General: Skin is warm.      Capillary Refill: Capillary refill takes less than 2 seconds.   Neurological:      General: No focal  deficit present.      Mental Status: She is alert and oriented to person, place, and time. Mental status is at baseline.      GCS: GCS eye subscore is 4. GCS verbal subscore is 5. GCS motor subscore is 6.      Motor: No weakness.      Comments: MAEE +equal strong  At this time she has full sensation to all extrem   Psychiatric:      Comments: Sad- tearful         Medications  Current Facility-Administered Medications   Medication Dose Route Frequency Provider Last Rate Last Admin   • levETIRAcetam (KEPPRA) 100 MG/ML solution 500 mg  500 mg Oral Q12HRS Gavin Hathaway M.D.       • senna-docusate (PERICOLACE or SENOKOT S) 8.6-50 MG per tablet 2 tablet  2 tablet Oral BID Chris Rajan M.D.        And   • polyethylene glycol/lytes (MIRALAX) PACKET 1 Packet  1 Packet Oral QDAY PRN Chris Rajan M.D.        And   • magnesium hydroxide (MILK OF MAGNESIA) suspension 30 mL  30 mL Oral QDAY PRN Chris Rajan M.D.        And   • bisacodyl (DULCOLAX) suppository 10 mg  10 mg Rectal QDAY PRN Chris Rajan M.D.       • Respiratory Therapy Consult   Nebulization Continuous RT Chris Rajan M.D.       • LORazepam (ATIVAN) injection 2 mg  2 mg Intravenous Q5 MIN PRN Chris Rajan M.D.       • ondansetron (ZOFRAN ODT) dispertab 4 mg  4 mg Oral Q4HRS PRN Chris Rajan M.D.        Or   • ondansetron (ZOFRAN) syringe/vial injection 4 mg  4 mg Intravenous Q4HRS PRN Chris Rajan M.D.       • MD Alert...ICU Electrolyte Replacement per Pharmacy   Other PHARMACY TO DOSE Chris Rajan M.D.       • promethazine (PHENERGAN) tablet 12.5-25 mg  12.5-25 mg Oral Q4HRS PRN Chris Rajan M.D.       • promethazine (PHENERGAN) suppository 12.5-25 mg  12.5-25 mg Rectal Q4HRS PRN Chris Rajan M.D.       • prochlorperazine (COMPAZINE) injection 5-10 mg  5-10 mg Intravenous Q4HRS PRN Chris Rajan M.D.       • niCARdipine (CARDENE) 25 mg in  mL Infusion  0-15 mg/hr Intravenous Continuous Chris  JOHN Rajan   Held at 06/19/21 1400   • acetaminophen (Tylenol) tablet 650 mg  650 mg Oral Q4HRS PRN Sal Arndt M.D.        Or   • acetaminophen (TYLENOL) suppository 650 mg  650 mg Rectal Q4HRS PRN Sal Arndt M.D.       • labetalol (NORMODYNE/TRANDATE) injection 10 mg  10 mg Intravenous Q4HRS PRN Sal Arndt M.D.       • hydrALAZINE (APRESOLINE) injection 10 mg  10 mg Intravenous Q4HRS PRN Sal Arndt M.D.       • enalaprilat (VASOTEC) injection 1.25 mg  1.25 mg Intravenous Q6HRS PRN Sal Arndt M.D.       • enoxaparin (LOVENOX) inj 80 mg  1 mg/kg Subcutaneous Q12HRS Franki Valladares M.D.   80 mg at 06/20/21 0552       Fluids    Intake/Output Summary (Last 24 hours) at 6/20/2021 1309  Last data filed at 6/20/2021 1200  Gross per 24 hour   Intake 2858.67 ml   Output 1600 ml   Net 1258.67 ml       Laboratory          Recent Labs     06/19/21  1228 06/19/21  1400 06/19/21 2026 06/20/21  0212 06/20/21  1209   SODIUM 135   < > 140 138 138   POTASSIUM 3.8  --   --  4.0  --    CHLORIDE 100  --   --  107  --    CO2 24  --   --  18*  --    BUN 7*  --   --  6*  --    CREATININE 0.46*  --   --  0.42*  --    MAGNESIUM  --   --   --   --  2.3   CALCIUM 9.1  --   --  8.6  --     < > = values in this interval not displayed.     Recent Labs     06/19/21  1228 06/19/21  1400 06/20/21  0212   ALTSGPT 66*  --  65*   ASTSGOT 34  --  48*   ALKPHOSPHAT 138*  --  122*   TBILIRUBIN 0.8  --  0.8   LIPASE  --  24  --    GLUCOSE 104*  --  161*     Recent Labs     06/19/21  1228 06/20/21  0212   WBC 4.8 5.8   NEUTSPOLYS 46.20 83.90*   LYMPHOCYTES 34.90 12.80*   MONOCYTES 15.90* 3.00   EOSINOPHILS 1.30 0.00   BASOPHILS 1.30 0.00   ASTSGOT 34 48*   ALTSGPT 66* 65*   ALKPHOSPHAT 138* 122*   TBILIRUBIN 0.8 0.8     Recent Labs     06/19/21  1228 06/20/21  0212   RBC 3.67* 3.41*   HEMOGLOBIN 13.4 12.3   HEMATOCRIT 39.3 36.4*   PLATELETCT 265 255   PROTHROMBTM 13.6  --    APTT 21.7*  --    INR 1.07  --         Imaging  X-Ray:  I have personally reviewed the images and compared with prior images.  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 Patient will need continued AC given concomitant cancer.  Seizure, aspiration, and fall precautions.  Glucose control to maintain <180    HOB at 30 degrees  Keep eunatremia, euvolemic, euthermic, and normoglycemic           Abdominal spasms- (present on admission)  Assessment & Plan  -partial seizure like activity observed by neurology and ERP, resolved. Continue Keppra  -CT abdomen obtained in ED showed moderate hepatomegaly and severe steatosis, Left outer breast postop seroma.   -Monitor for reoccurrence.     History of breast cancer- (present on admission)  Assessment & Plan  On PO chemotherapy for 1 year treated by oncology team in [place].  Left mastectomy Jan 2020-          VTE:  Lovenox  Ulcer: H2 Antagonist  Lines: right tabatha cath placed out-pt - site wnl     I have performed a physical exam and reviewed and updated ROS and Plan today (6/20/2021). In review of yesterday's note (6/19/2021), there are no changes except as documented above.     Discussed patient condition and risk of morbidity and/or mortality with Hospitalist, RN, Pharmacy, Charge nurse / hot rounds, Patient and neurology and neurosurgery  The patient remains critically ill.  Critical care time = 38 minutes in directly providing and coordinating critical care and extensive data review.  No time overlap and excludes  procedures.

## 2021-06-20 NOTE — CONSULTS
DATE OF SERVICE:  06/19/2021     NEUROSURGERY CONSULTATION     HISTORY OF PRESENT ILLNESS:  The patient is a very pleasant 46-year-old female   who lives in [place] with a history of breast cancer, on chemotherapy and   status post radiation, who had a subacute to acute onset of left-sided   numbness, tingling, and weakness that started last night.  She did not come to   the hospital at all this morning.  No nausea or vomiting.  No weakness,   numbness or tingling.     PAST MEDICAL HISTORY:  Breast cancer.     PAST SURGICAL HISTORY:  None.     FAMILY HISTORY:  Noncontributory.     SOCIAL HISTORY:  No smoking, no ETOH, no illicits.     ALLERGIES:  None.     MEDICATIONS:  Chemotherapy.     PHYSICAL EXAMINATION:  GENERAL:  Awake, alert and oriented x3, GCS of 15.  HEENT:  Pupils equal, round, reactive to light.  Extraocular muscles intact.    Tongue midline.  NEUROLOGIC:  Face symmetric.  Right side is 5/5, left side is 4 to 4+/5,   little dyspraxic.  She does have a left-sided drift.     DIAGNOSTIC DATA:  CT scan of the brain, noncontrast and CT angiogram show a   right-sided parietal intracerebral hemorrhage.  CT angiogram demonstrates a   small filling defect in the superior sagittal sinus.  MRI shows a trace   enlargement without clear filling defect.     PLAN:  Keppra 500 b.i.d. x7 days.  Keep systolic less than 160.  At this   point, I would get a CT venogram and MR venogram before starting the patient   on anticoagulation.  However, the patient has already been started on   therapeutic Lovenox.  We will follow.        ______________________________  MD EUNICE Casanova/STEFANY    DD:  06/19/2021 19:51  DT:  06/19/2021 20:09    Job#:  [id number]

## 2021-06-21 PROBLEM — G08 THROMBOSIS OF SUPERIOR SAGITTAL SINUS: Status: ACTIVE | Noted: 2021-06-21

## 2021-06-21 LAB
ANION GAP SERPL CALC-SCNC: 12 MMOL/L (ref 7–16)
BUN SERPL-MCNC: 7 MG/DL (ref 8–22)
CALCIUM SERPL-MCNC: 7.9 MG/DL (ref 8.5–10.5)
CHLORIDE SERPL-SCNC: 108 MMOL/L (ref 96–112)
CO2 SERPL-SCNC: 23 MMOL/L (ref 20–33)
CREAT SERPL-MCNC: 0.39 MG/DL (ref 0.5–1.4)
ERYTHROCYTE [DISTWIDTH] IN BLOOD BY AUTOMATED COUNT: 55.5 FL (ref 35.9–50)
GLUCOSE SERPL-MCNC: 114 MG/DL (ref 65–99)
HCT VFR BLD AUTO: 35.5 % (ref 37–47)
HGB BLD-MCNC: 12.3 G/DL (ref 12–16)
MAGNESIUM SERPL-MCNC: 2.2 MG/DL (ref 1.5–2.5)
MCH RBC QN AUTO: 36.9 PG (ref 27–33)
MCHC RBC AUTO-ENTMCNC: 34.6 G/DL (ref 33.6–35)
MCV RBC AUTO: 106.6 FL (ref 81.4–97.8)
PLATELET # BLD AUTO: 258 K/UL (ref 164–446)
PMV BLD AUTO: 9.8 FL (ref 9–12.9)
POTASSIUM SERPL-SCNC: 3.7 MMOL/L (ref 3.6–5.5)
RBC # BLD AUTO: 3.33 M/UL (ref 4.2–5.4)
SODIUM SERPL-SCNC: 139 MMOL/L (ref 135–145)
SODIUM SERPL-SCNC: 143 MMOL/L (ref 135–145)
WBC # BLD AUTO: 7.1 K/UL (ref 4.8–10.8)

## 2021-06-21 PROCEDURE — 700102 HCHG RX REV CODE 250 W/ 637 OVERRIDE(OP): Performed by: INTERNAL MEDICINE

## 2021-06-21 PROCEDURE — 700102 HCHG RX REV CODE 250 W/ 637 OVERRIDE(OP): Performed by: HOSPITALIST

## 2021-06-21 PROCEDURE — 770004 HCHG ROOM/CARE - ONCOLOGY PRIVATE *

## 2021-06-21 PROCEDURE — 80048 BASIC METABOLIC PNL TOTAL CA: CPT

## 2021-06-21 PROCEDURE — 85027 COMPLETE CBC AUTOMATED: CPT

## 2021-06-21 PROCEDURE — 97161 PT EVAL LOW COMPLEX 20 MIN: CPT

## 2021-06-21 PROCEDURE — 97535 SELF CARE MNGMENT TRAINING: CPT

## 2021-06-21 PROCEDURE — 99233 SBSQ HOSP IP/OBS HIGH 50: CPT | Performed by: NURSE PRACTITIONER

## 2021-06-21 PROCEDURE — 92523 SPEECH SOUND LANG COMPREHEN: CPT

## 2021-06-21 PROCEDURE — 84295 ASSAY OF SERUM SODIUM: CPT

## 2021-06-21 PROCEDURE — 97165 OT EVAL LOW COMPLEX 30 MIN: CPT

## 2021-06-21 PROCEDURE — A9270 NON-COVERED ITEM OR SERVICE: HCPCS | Performed by: INTERNAL MEDICINE

## 2021-06-21 PROCEDURE — A9270 NON-COVERED ITEM OR SERVICE: HCPCS | Performed by: HOSPITALIST

## 2021-06-21 PROCEDURE — 700111 HCHG RX REV CODE 636 W/ 250 OVERRIDE (IP): Performed by: PSYCHIATRY & NEUROLOGY

## 2021-06-21 PROCEDURE — 99233 SBSQ HOSP IP/OBS HIGH 50: CPT | Performed by: HOSPITALIST

## 2021-06-21 PROCEDURE — 83735 ASSAY OF MAGNESIUM: CPT

## 2021-06-21 RX ORDER — POTASSIUM CHLORIDE 20 MEQ/1
40 TABLET, EXTENDED RELEASE ORAL ONCE
Status: COMPLETED | OUTPATIENT
Start: 2021-06-21 | End: 2021-06-21

## 2021-06-21 RX ADMIN — ENOXAPARIN SODIUM 80 MG: 80 INJECTION SUBCUTANEOUS at 05:10

## 2021-06-21 RX ADMIN — LEVETIRACETAM 500 MG: 100 SOLUTION ORAL at 05:11

## 2021-06-21 RX ADMIN — LEVETIRACETAM 500 MG: 100 SOLUTION ORAL at 17:32

## 2021-06-21 RX ADMIN — POTASSIUM CHLORIDE 40 MEQ: 1500 TABLET, EXTENDED RELEASE ORAL at 12:00

## 2021-06-21 RX ADMIN — ENOXAPARIN SODIUM 80 MG: 80 INJECTION SUBCUTANEOUS at 17:32

## 2021-06-21 ASSESSMENT — GAIT ASSESSMENTS
DISTANCE (FEET): 250
GAIT LEVEL OF ASSIST: SUPERVISED
DEVIATION: NO DEVIATION

## 2021-06-21 ASSESSMENT — ENCOUNTER SYMPTOMS
CHILLS: 0
SORE THROAT: 0
FOCAL WEAKNESS: 1
ABDOMINAL PAIN: 0
DIARRHEA: 0
PALPITATIONS: 0
BLURRED VISION: 0
LOSS OF CONSCIOUSNESS: 0
DIZZINESS: 0
FEVER: 0
NAUSEA: 0
HEADACHES: 0
DOUBLE VISION: 0
BACK PAIN: 0
SHORTNESS OF BREATH: 0
COUGH: 0
VOMITING: 0

## 2021-06-21 ASSESSMENT — COGNITIVE AND FUNCTIONAL STATUS - GENERAL
DAILY ACTIVITIY SCORE: 21
SUGGESTED CMS G CODE MODIFIER MOBILITY: CJ
TOILETING: A LITTLE
CLIMB 3 TO 5 STEPS WITH RAILING: A LITTLE
DRESSING REGULAR LOWER BODY CLOTHING: A LITTLE
STANDING UP FROM CHAIR USING ARMS: A LITTLE
SUGGESTED CMS G CODE MODIFIER DAILY ACTIVITY: CJ
HELP NEEDED FOR BATHING: A LITTLE
MOBILITY SCORE: 21
WALKING IN HOSPITAL ROOM: A LITTLE

## 2021-06-21 ASSESSMENT — ACTIVITIES OF DAILY LIVING (ADL): TOILETING: INDEPENDENT

## 2021-06-21 NOTE — THERAPY
Occupational Therapy   Initial Evaluation     Patient Name: Pily Buckley  Age:  46 y.o., Sex:  female  Medical Record #: [id number]  Today's Date: 6/21/2021     Precautions  Precautions: Fall Risk    Assessment  Patient is 46 y.o. female with a diagnosis of R parietal hemorrhage. Hx of breast CA w/ chemo and radiation.  Additional factors influencing patient status / progress: LUE impaired coordination and strength. Provided with HEP to address the deficits of L UE.      Plan    Recommend Occupational Therapy 3 times per week for 2 visits for the following treatments:  Adaptive Equipment, Neuro Re-Education / Balance, Self Care/Activities of Daily Living, Therapeutic Activities and Therapeutic Exercises.    DC Equipment Recommendations: None  Discharge Recommendations: Recommend outpatient occupational therapy services to address higher level deficits     Subjective    Motivated to participate     Objective       06/21/21 3752   Prior Living Situation   Prior Services Home-Independent   Housing / Facility 1 Story House   Steps Into Home 0   Bathroom Set up Bathtub / Shower Combination;Shower Glass Doors   Equipment Owned None   Lives with - Patient's Self Care Capacity Spouse;Parents   Comments Pt lives with her  who does not work and can assist if needed. She also lives with her parents who are independent.   Prior Level of ADL Function   Self Feeding Independent   Grooming / Hygiene Independent   Bathing Independent   Dressing Independent   Toileting Independent   Prior Level of IADL Function   Medication Management Independent   Laundry Independent   Kitchen Mobility Independent   Finances Independent   Home Management Independent   Shopping Independent   Prior Level Of Mobility Independent Without Device in Community;Independent Without Device in Home   Driving / Transportation Driving Independent   Occupation (Pre-Hospital Vocational) Not Employed   Precautions   Precautions Fall Risk   Pain 0 - 10 Group    Therapist Pain Assessment Nurse Notified;0   Cognition    Cognition / Consciousness WDL   Level of Consciousness Alert   Comments pleasant and cooperative, motivated to participate. utilized  [id number]   Active ROM Upper Body   Active ROM Upper Body  WDL   Dominant Hand Right   Strength Upper Body   Upper Body Strength  X   Comments LUE 4/5   Sensation Upper Body   Upper Extremity Sensation  WDL   Upper Body Muscle Tone   Upper Body Muscle Tone  WDL   Coordination Upper Body   Coordination X   Comments LUE w/ impaired gross and fine motor coordination. Mostly functional for ADLs, drops items occasionally and requires extra time to complete.    Balance Assessment   Sitting Balance (Static) Fair +   Sitting Balance (Dynamic) Fair   Standing Balance (Static) Fair +   Standing Balance (Dynamic) Fair   Weight Shift Sitting Fair   Weight Shift Standing Fair   Comments no AD   Bed Mobility    Comments up in chair pre/post   ADL Assessment   Grooming Supervision;Standing  (oral care, wash face)   Bathing Modified Independent   Lower Body Dressing Supervision  (don/doff socks, extra time)   Toileting Supervision   How much help from another person does the patient currently need...   Putting on and taking off regular lower body clothing? 3   Bathing (including washing, rinsing, and drying)? 3   Toileting, which includes using a toilet, bedpan, or urinal? 3   Putting on and taking off regular upper body clothing? 4   Taking care of personal grooming such as brushing teeth? 4   Eating meals? 4   6 Clicks Daily Activity Score 21   Modified [place] (mRS)   Modified [place] Score 2   Functional Mobility   Sit to Stand Supervised   Bed, Chair, Wheelchair Transfer Supervised   Toilet Transfers Supervised   Mobility chair>ICU BR>Chair   Comments no AD   ICU Target Mobility Level   ICU Mobility - Targeted Level Level 4   Visual Perception   Visual Perception  X   Comments Mild L inattention   Patient / Family Goals   Patient  / Family Goal #1 to regain function of her LUE   Short Term Goals   Short Term Goal # 1 pt will complete HEP for LUE independently by discharge

## 2021-06-21 NOTE — THERAPY
Speech Language Pathology   Initial Assessment     Patient Name: Pily Buckley  AGE:  46 y.o., SEX:  female  Medical Record #: [id number]  Today's Date: 6/21/2021          Assessment    Patient is 46 y.o. female admitted 6/19/21 for extremity weakness and abdominal pain. Past medical history of breast cancer, status post left pneumonectomy, undergoing oral chemotherapy. MRI of brain: RIGHT frontoparietal parenchymal hemorrhage without evidence of underlying mass or vascular malformation. This appears slightly larger than on the CT. Small amount of RIGHT supratentorial subarachnoid blood. Additional factors influencing patient status/progress: acute CVA.    Patient was seen on this date for a cognitive-linguistic evaluation. Session was performed in Mauritian by this [facility] certified . Patient reported no acute changes in mentation. Patient lives with spouse and her parents who do not work and would be able to assist patient as needed. Patient does not work due to being laid off from pandemic and dx of breast cancer.     The Cognitive Linguistic Quick Test (CLQT) [place] Version was administered in full which revealed the following results: severe deficits in clock drawing; moderate deficits in executive functions; mild deficits in attention and visuospatial skills; and WNL in memory and language. During clock drawing task, patient wrote numbers in reverse order with left neglect and impaired hand placement. Further testing revealed verbal problem solving skill and reading comprehension at the multi sentence level was WNL. Education provided to patient regarding current status and SLP recs.     Plan    Recommend distant/stand-by assist with IADLs (e.g., medication management, financial management, cooking, scheduling, etc.) and outpatient/ SLP services if patient feels she is not at her PLOF once d/c home.     Recommend Speech Therapy 3 times per week until therapy goals are met for the following treatments:   Cognitive-Linguistic Training and Patient / Family / Caregiver Education.    Discharge Recommendations: Recommend home health for continued speech therapy services     Objective       06/21/21 1130   Vitals   O2 Delivery Device Room air w/o2 available   Prior Level Of Function   Communication Within Functional Limits   Swallow Within Functional Limits   Hearing Within Functional Limits for Evaluation   Hearing Aid None   Vision Within Functional Limits for Evaluation   Patient's Primary Language English   Education Years Completed 15   Verbal Expression   Verbal Expression / Aphasia Eval (WDL) WDL   Auditory Comprehension   Auditory Comprehension (WDL) WDL   Reading Comprehension   Reading Comprehension (WDL) WDL   Written Expression   Written Expression (WDL) WDL   Cognitive-Linguistic   Level of Consciousness Alert   Orientation Level Oriented x 4   Sustained Attention Within Functional Limits (6-7)   Alternating Attention Minimal (4)   Divided Attention Minimal (4)   Selective Attention Minimal (4)   Visual Scanning / Cancellation Skills Supervision (5)   Path Finding Minimal (4)   Safety Awareness Within Functional Limits (6-7)   Insight into Deficits Minimal (4)   Executive Functioning / Organization Moderate (3)   Clock Drawing Left Neglect;Poor Planning;Disorganization;Numeric Errors;Impaired Hand Placement   Outcome Measures   Outcome Measures Utilized CLQT   CLQT (Cognitive Linguistic Quick Test)   Composite Severity Rating Mild   Short Term Goals   Short Term Goal # 1 Patient will perform pen and paper tasks targeting executive functioning with 90% accuracy given min A.

## 2021-06-21 NOTE — ASSESSMENT & PLAN NOTE
Noted on MRI w/wo.    Doing MRV to confirm  On enoxaparin 80mg/kg  DW Dr Montero pt's Heme/Onc in [place]: she agrees with DC on apixaban

## 2021-06-21 NOTE — DISCHARGE PLANNING
[facility] Transitional Care Coordination    Referral from:  Dr. Rajan  Insurance Provider on Facesheet: [employer]  Potential Rehab Diagnosis:  ICH    Chart review indicates patient has on going medical management and may have therapy needs to possibly meet inpatient rehab facility criteria with the goal of returning to community.    D/C support:  To be determined     Physiatry consultation pended while waiting for additional information.  ICH.  Would welcome PT/OT as clinically appropriate.  TCC will follow.  Please reach out sooner if PMR consult requested for medical management.      Last Covid test:  6/19/2021 not detected    Please note - [place] [employer] insurance is not a provider for [facility].  Anticipate post acute transitional care in-network with [employer].       Thank you for the referral.

## 2021-06-21 NOTE — PROGRESS NOTES
Hospital Medicine Daily Progress Note    Date of Service  6/21/2021    Chief Complaint  46 y.o. female admitted 6/19/2021 with L side weakness    Hospital Course  47yo PMHx breast CA s/p lumpectomy and on Xeloda.  Presented with L arm weakness.  CT showing R frontoparietal IPH, no LVO or mass.  Follow up MRI confirms bleed, neg mass but pos for sagital venous thrombosis.  Started on Keppra and Enoxaparin.      Interval Problem Update  Pt notes L arm still weak but a little stronger then yesterday.  Does not have weakness anywhere else.  Up and walking without issue    Dw pt and her  at bedside as well as daughter by phone.  Total time x 30mins    Consultants/Specialty  Neuro  Neuro Srgry    Code Status  Full Code    Disposition  OK to floor  Plan DC likely on DOAC    Review of Systems  Review of Systems   Constitutional: Negative for chills and fever.   HENT: Negative for nosebleeds and sore throat.    Eyes: Negative for blurred vision and double vision.   Respiratory: Negative for cough and shortness of breath.    Cardiovascular: Negative for chest pain, palpitations and leg swelling.   Gastrointestinal: Negative for abdominal pain, diarrhea, nausea and vomiting.   Genitourinary: Negative for dysuria and urgency.   Musculoskeletal: Negative for back pain.   Skin: Negative for rash.   Neurological: Positive for focal weakness. Negative for dizziness, loss of consciousness and headaches.        Physical Exam  Temp:  [35.9 °C (96.7 °F)-36.4 °C (97.5 °F)] 36.3 °C (97.4 °F)  Pulse:  [64-97] 76  Resp:  [12-68] 20  BP: (114-151)/(56-88) 114/69  SpO2:  [94 %-99 %] 96 %    Physical Exam  Constitutional:       General: She is not in acute distress.     Appearance: She is well-developed. She is not diaphoretic.   HENT:      Head: Normocephalic and atraumatic.   Neck:      Vascular: No JVD.   Cardiovascular:      Rate and Rhythm: Normal rate and regular rhythm.      Heart sounds: No murmur heard.     Pulmonary:       Effort: Pulmonary effort is normal. No respiratory distress.      Breath sounds: No stridor. No wheezing or rales.   Abdominal:      Palpations: Abdomen is soft.      Tenderness: There is no abdominal tenderness. There is no guarding or rebound.   Musculoskeletal:      Right lower leg: No edema.      Left lower leg: No edema.   Skin:     General: Skin is warm and dry.      Findings: No rash.   Neurological:      Mental Status: She is oriented to person, place, and time.      Comments: 3/5 L arm and hand  5/5 elswhere  CN II-XII intact   Psychiatric:         Thought Content: Thought content normal.         Fluids    Intake/Output Summary (Last 24 hours) at 6/21/2021 0700  Last data filed at 6/21/2021 0600  Gross per 24 hour   Intake 3030 ml   Output 1700 ml   Net 1330 ml       Laboratory  Recent Labs     06/19/21  1228 06/20/21  0212 06/21/21  0519   WBC 4.8 5.8 7.1   RBC 3.67* 3.41* 3.33*   HEMOGLOBIN 13.4 12.3 12.3   HEMATOCRIT 39.3 36.4* 35.5*   .1* 106.7* 106.6*   MCH 36.5* 36.1* 36.9*   MCHC 34.1 33.8 34.6   RDW 55.5* 54.9* 55.5*   PLATELETCT 265 255 258   MPV 9.5 9.7 9.8     Recent Labs     06/19/21  1228 06/19/21  1400 06/20/21  0212 06/20/21  1209 06/20/21  1750 06/21/21  0052 06/21/21  0519   SODIUM 135   < > 138   < > 139 139 143   POTASSIUM 3.8  --  4.0  --   --   --  3.7   CHLORIDE 100  --  107  --   --   --  108   CO2 24  --  18*  --   --   --  23   GLUCOSE 104*  --  161*  --   --   --  114*   BUN 7*  --  6*  --   --   --  7*   CREATININE 0.46*  --  0.42*  --   --   --  0.39*   CALCIUM 9.1  --  8.6  --   --   --  7.9*    < > = values in this interval not displayed.     Recent Labs     06/19/21  1228   APTT 21.7*   INR 1.07         Recent Labs     06/19/21  1400   TRIGLYCERIDE 123   HDL 38*   LDL 58       Imaging  CT-HEAD W/O   Final Result      No significant change in intraparenchymal hemorrhage. Small amount of subarachnoid hemorrhage is more pronounced than on the prior exam. No midline shift          MR-BRAIN-WITH & W/O   Final Result   Addendum 1 of 1   On further review the images and comparison to CTA head performed earlier    the same date there is a subtle filling defect defect in the superior    sagittal sinus near the vertex with what appear to be filling of small    peripheral veins in this region.    Similar findings are also present along the RIGHT transverse sinus. These    findings are suggestive of dural venous sinus thrombosis.      Findings were discussed with Dr. JUARES on 6/20/2021 10:25 AM.      Final      1.  RIGHT frontoparietal parenchymal hemorrhage without evidence of underlying mass or vascular malformation. This appears slightly larger than on the CT.   2.   Small amount of RIGHT supratentorial subarachnoid blood   3.  No herniation      DX-CHEST-PORTABLE (1 VIEW)   Final Result         No acute cardiac or pulmonary abnormality is identified.      CT-ABDOMEN-PELVIS WITH   Final Result      1.  No CT evidence of metastasis      2.  Moderate hepatomegaly with severe steatosis      3.  Lower-outer left breast postoperative seroma favored over abscess or residual mass. Recommend clinical correlation      CT-CEREBRAL PERFUSION ANALYSIS   Final Result      1.  Cerebral blood flow less than 30% likely representing completed infarct = 0 mL.      2.  T Max more than 6 seconds likely representing combination of completed infarct and ischemia = 0 mL.      3.  Mismatched volume likely representing ischemic brain/penumbra = None      4.  Please note that the cerebral perfusion was performed on the limited brain tissue around the basal ganglia region. Infarct/ischemia outside the CT perfusion sections can be missed in this study.      CT-CTA NECK WITH & W/O-POST PROCESSING   Final Result      CT angiogram of the neck within normal limits.      CT-CTA HEAD WITH & W/O-POST PROCESS   Final Result      CT angiogram of the [place] within normal limits.      CT-HEAD W/O   Final Result          1.  Multifocal parenchymal hemorrhage with some surrounding edema is identified in the right parietal lobe as described above. Differential diagnosis does include parenchymal bleeding related to a brain neoplasm. MRI with and without contrast could be    obtained for further evaluation.      2.  Mild adjacent subarachnoid hemorrhage is also noted.      These findings were discussed with NIKOLAS DICKINSON on 06/19/2021.           Assessment/Plan  * Intracranial hemorrhage (HCC)- (present on admission)  Assessment & Plan  R fronto/parietal IPH thought secondary to superior venous thrombosis  Non surgical management  MRI did not show any other issues  PT/OT consulted  DC on Apixaban  Dw pt's Heme/Onc Dr in [place].  (Dr. Escobar [phone] in [place])      Thrombosis of superior sagittal sinus  Assessment & Plan  Noted on MRI w/wo.    Doing MRV to confirm  On enoxaparin 80mg/kg  DW Dr Montero pt's Heme/Onc in [place]: she agrees with DC on apixaban    Abdominal spasms- (present on admission)  Assessment & Plan  Possibly related to partial seizure  CT of the abdomen and pelvis with contrast showed no evidence of metastasis, there is moderate hepatomegaly with severe steatosis, left outer breast postop seroma    History of breast cancer- (present on admission)  Assessment & Plan  S/p lumpectomy, XRT, IV chemo and is now finishing course of po Xeloda  Deshpande CT done here (chest/abd/plvs) showing no evidence of metastatsis  MRI brain likewise neg for mets  F/b Onc in [place]       VTE prophylaxis: enoxaparin

## 2021-06-21 NOTE — PROGRESS NOTES
Neurology Progress Note  Neurohospitalist Service, [facility]    Referring Physician: Feliciano Haynes DO    Reason for referral: acute onset LUE numbness and weakness    HPI: Refer to initial documented Neurology H&P, as detailed in the patient's chart.    Interval Note 6/21: *HISTORY TAKEN WITH USE OF : No acute events overnight. The patient is reporting improvement but not resolution of LUE weakness and sensation changes. Denies headache, new symptoms. She remains in ICU for BP management.    Review of systems: In addition to what is detailed in the HPI and/or updated in the interval history, all other systems reviewed and are negative.    Past Medical History:    has a past medical history of Cancer (HCC).    FHx:  No history of strokes in the young    SHx:   reports that she has never smoked. She does not have any smokeless tobacco history on file.    Medications:    Current Facility-Administered Medications:   •  levETIRAcetam (KEPPRA) 100 MG/ML solution 500 mg, 500 mg, Oral, Q12HRS, Gavin Hathaway M.D., 500 mg at 06/21/21 0511  •  senna-docusate (PERICOLACE or SENOKOT S) 8.6-50 MG per tablet 2 tablet, 2 tablet, Oral, BID **AND** polyethylene glycol/lytes (MIRALAX) PACKET 1 Packet, 1 Packet, Oral, QDAY PRN **AND** magnesium hydroxide (MILK OF MAGNESIA) suspension 30 mL, 30 mL, Oral, QDAY PRN **AND** bisacodyl (DULCOLAX) suppository 10 mg, 10 mg, Rectal, QDAY PRN, Chris Rajan M.D.  •  Respiratory Therapy Consult, , Nebulization, Continuous RT, Chris Rajan M.D.  •  LORazepam (ATIVAN) injection 2 mg, 2 mg, Intravenous, Q5 MIN PRN, Chris Rajan M.D.  •  ondansetron (ZOFRAN ODT) dispertab 4 mg, 4 mg, Oral, Q4HRS PRN **OR** ondansetron (ZOFRAN) syringe/vial injection 4 mg, 4 mg, Intravenous, Q4HRS PRN, Chris Rajan M.D.  •  MD Alert...ICU Electrolyte Replacement per Pharmacy, , Other, PHARMACY TO DOSE, Chris Rajan M.D.  •  promethazine (PHENERGAN)  tablet 12.5-25 mg, 12.5-25 mg, Oral, Q4HRS PRN, Chris Rajan M.D.  •  promethazine (PHENERGAN) suppository 12.5-25 mg, 12.5-25 mg, Rectal, Q4HRS PRN, Chris Rajan M.D.  •  prochlorperazine (COMPAZINE) injection 5-10 mg, 5-10 mg, Intravenous, Q4HRS PRN, Chris Rajan M.D.  •  acetaminophen (Tylenol) tablet 650 mg, 650 mg, Oral, Q4HRS PRN **OR** acetaminophen (TYLENOL) suppository 650 mg, 650 mg, Rectal, Q4HRS PRN, Sal Arndt M.D.  •  labetalol (NORMODYNE/TRANDATE) injection 10 mg, 10 mg, Intravenous, Q4HRS PRN, Sal Arndt M.D.  •  hydrALAZINE (APRESOLINE) injection 10 mg, 10 mg, Intravenous, Q4HRS PRN, Sal Arndt M.D.  •  enalaprilat (VASOTEC) injection 1.25 mg, 1.25 mg, Intravenous, Q6HRS PRN, Sal Arndt M.D.  •  enoxaparin (LOVENOX) inj 80 mg, 1 mg/kg, Subcutaneous, Q12HRS, Franki Valladares M.D., 80 mg at 06/21/21 0510    Physical Examination:     Vitals:    06/21/21 0500 06/21/21 0600 06/21/21 0700 06/21/21 0800   BP: 114/69  130/70    Pulse: 76  76    Resp: 20  17    Temp:  36.3 °C (97.4 °F)  36.1 °C (97 °F)   TempSrc:  Temporal  Temporal   SpO2: 96%  96%    Weight:       Height:         *EXAM PERFORMED WITH USE OF :    General: Patient is seated upright in chair, awake and in no acute distress  Eyes: examination of optic disks not indicated at this time  CV: NSR 70's    NEUROLOGICAL EXAM:     Mental status: Awake, alert and fully oriented, follows commands  Speech and language: speech is not dysarthric. The patient is able to name and repeat.  Cranial nerve exam: Pupils are equal, round and reactive to light bilaterally. Visual fields are full. Extraocular muscles are intact. Sensation in the face is intact to light touch. Face is symmetric. Hearing to finger rub equal. Palate elevates symmetrically. Shoulder shrug is full. Tongue is midline.  Motor exam: Strength is 4/5 in distal LUE, 5/5 proximally. All other extremities 5/5 both distally and proximally. Tone  is normal. No abnormal movements were seen on exam.  Sensory exam: Mildly decreased sensation of light touch in LUE compared to RUE  Deep tendon reflexes: 2+ and symmetric. Toes down-going bilaterally.  Coordination: no ataxia   Gait: deferred given patient preference    Objective Data:    Labs:  Lab Results   Component Value Date/Time    PROTHROMBTM 13.6 06/19/2021 12:28 PM    INR 1.07 06/19/2021 12:28 PM      Lab Results   Component Value Date/Time    WBC 7.1 06/21/2021 05:19 AM    RBC 3.33 (L) 06/21/2021 05:19 AM    HEMOGLOBIN 12.3 06/21/2021 05:19 AM    HEMATOCRIT 35.5 (L) 06/21/2021 05:19 AM    .6 (H) 06/21/2021 05:19 AM    MCH 36.9 (H) 06/21/2021 05:19 AM    MCHC 34.6 06/21/2021 05:19 AM    MPV 9.8 06/21/2021 05:19 AM    NEUTSPOLYS 83.90 (H) 06/20/2021 02:12 AM    LYMPHOCYTES 12.80 (L) 06/20/2021 02:12 AM    MONOCYTES 3.00 06/20/2021 02:12 AM    EOSINOPHILS 0.00 06/20/2021 02:12 AM    BASOPHILS 0.00 06/20/2021 02:12 AM      Lab Results   Component Value Date/Time    SODIUM 143 06/21/2021 05:19 AM    POTASSIUM 3.7 06/21/2021 05:19 AM    CHLORIDE 108 06/21/2021 05:19 AM    CO2 23 06/21/2021 05:19 AM    GLUCOSE 114 (H) 06/21/2021 05:19 AM    BUN 7 (L) 06/21/2021 05:19 AM    CREATININE 0.39 (L) 06/21/2021 05:19 AM      Lab Results   Component Value Date/Time    CHOLSTRLTOT 121 06/19/2021 02:00 PM    LDL 58 06/19/2021 02:00 PM    HDL 38 (A) 06/19/2021 02:00 PM    TRIGLYCERIDE 123 06/19/2021 02:00 PM       Lab Results   Component Value Date/Time    ALKPHOSPHAT 122 (H) 06/20/2021 02:12 AM    ASTSGOT 48 (H) 06/20/2021 02:12 AM    ALTSGPT 65 (H) 06/20/2021 02:12 AM    TBILIRUBIN 0.8 06/20/2021 02:12 AM        Imaging/Testing:    I interpreted and/or reviewed the patient's neuroimaging    CT-HEAD W/O   Final Result      No significant change in intraparenchymal hemorrhage. Small amount of subarachnoid hemorrhage is more pronounced than on the prior exam. No midline shift         MR-BRAIN-WITH & W/O   Final Result    Addendum 1 of 1   On further review the images and comparison to CTA head performed earlier    the same date there is a subtle filling defect defect in the superior    sagittal sinus near the vertex with what appear to be filling of small    peripheral veins in this region.    Similar findings are also present along the RIGHT transverse sinus. These    findings are suggestive of dural venous sinus thrombosis.      Findings were discussed with Dr. JUARES on 6/20/2021 10:25 AM.      Final      1.  RIGHT frontoparietal parenchymal hemorrhage without evidence of underlying mass or vascular malformation. This appears slightly larger than on the CT.   2.   Small amount of RIGHT supratentorial subarachnoid blood   3.  No herniation      DX-CHEST-PORTABLE (1 VIEW)   Final Result         No acute cardiac or pulmonary abnormality is identified.      CT-ABDOMEN-PELVIS WITH   Final Result      1.  No CT evidence of metastasis      2.  Moderate hepatomegaly with severe steatosis      3.  Lower-outer left breast postoperative seroma favored over abscess or residual mass. Recommend clinical correlation      CT-CEREBRAL PERFUSION ANALYSIS   Final Result      1.  Cerebral blood flow less than 30% likely representing completed infarct = 0 mL.      2.  T Max more than 6 seconds likely representing combination of completed infarct and ischemia = 0 mL.      3.  Mismatched volume likely representing ischemic brain/penumbra = None      4.  Please note that the cerebral perfusion was performed on the limited brain tissue around the basal ganglia region. Infarct/ischemia outside the CT perfusion sections can be missed in this study.      CT-CTA NECK WITH & W/O-POST PROCESSING   Final Result      CT angiogram of the neck within normal limits.      CT-CTA HEAD WITH & W/O-POST PROCESS   Final Result      CT angiogram of the [place] within normal limits.      CT-HEAD W/O   Final Result         1.  Multifocal parenchymal hemorrhage  with some surrounding edema is identified in the right parietal lobe as described above. Differential diagnosis does include parenchymal bleeding related to a brain neoplasm. MRI with and without contrast could be    obtained for further evaluation.      2.  Mild adjacent subarachnoid hemorrhage is also noted.      These findings were discussed with NIKOLAS DICKINSON on 06/19/2021.          Assessment and Plan:    Pily Buckley is a 46 y.o. female with history of breast cancer currently undergoing chemotherapy who [facility] with c/o left arm weakness and numbness and for whom neurology was consulted with concern for stroke. Initial NIHSS 3 for LUE weakness with drift and sensation changes. Noncontrast CTH revealed intraparenchymal hemorrhage with edema in the right parietal lobe. The patient was not considered a tPA candidate given the right parietal IPH. CTA head and neck negative for LVO, high-grade stenosis, or aneurysm; however, did reveal enhancement of the superior sagittal sinus vein concerning for central venous thrombosis. 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  Patient was started on therapeutic enoxaparin 1mg/kg q12hr. Etiology of venous thrombosis is likely hypercoagulability in the setting of cancer. An interval CT head showed stability. She remains admitted to ICU for blood pressure management; neurology will continue to follow.     Plan:     - May liberate neurological assessments and VS to Q 4 hours  - BP goal 100-140/60-80  - Continue enoxaparin 80mg SQ q12hr. Patient will need continued AC given concomitant cancer.   - HOB at 30 degrees  - Maintain pCO2 of 35-40  - Keep eunatremia, euvolemic, euthermic, and normoglycemic (140-180)   - Serum sodium goal: normal  - DVT ppx: SCDs     The evaluation of the patient, and recommended  management, was discussed with attending neurologist, Dr. Umesh Valladares. I have performed a physical exam and reviewed and updated ROS and Plan today (6/21/2021). In review of yesterday's note (6/20/2021), there are no changes except as documented above.    Corinne E. Canavero, APRN  Neurohospitalist [facility], Acute Care Services

## 2021-06-21 NOTE — THERAPY
Physical Therapy   Initial Evaluation     Patient Name: Pily Buckley  Age:  46 y.o., Sex:  female  Medical Record #: [id number]  Today's Date: 6/21/2021     Precautions: Fall Risk    Assessment  Patient is 46 y.o. female who presented acutely 6/19/21 c/o sudden L UE weakness and numbness, LUE shaking, and L upper abdomen spasms.  Patient found to have multifocal parenchymal hemorrhage with surrounding edema in R parietal lobe.  Today patient with B LE strength grossly 5/5, no report of altered sensation.  Patient able to perform supine > sit with SPV, no use of bed features.  She was able to ambulate approx. 250 ft with SPV and no AD, no gross deviations or LOB.  Patient presents with no further acute mobility needs.  Patient will not be actively followed for physical therapy services at this time, however may be seen if requested by physician for 1 more visit within 30 days to address any discharge or equipment needs.    Plan    Recommend Physical Therapy for Evaluation only.    DC Equipment Recommendations: None  Discharge Recommendations: Anticipate that the patient will have no further physical therapy needs after discharge from the hospital        Objective     06/21/21 1619   Precautions   Precautions Fall Risk   Pain   Pain Scales 0 to 10 Scale    Pain 0 - 10 Group   Pain Rating Scale (NPRS) 0   Comfort Goal Comfort with Movement;Perform Activity   Therapist Pain Assessment Post Activity Pain Same as Prior to Activity   Prior Living Situation   Prior Services Home-Independent   Housing / Facility 1 Story House   Steps Into Home 0   Rail None   Equipment Owned None   Lives with - Patient's Self Care Capacity Spouse;Parents   Comments Reports  and parents can help as needed   Prior Level of Functional Mobility   Bed Mobility Independent   Transfer Status Independent   Ambulation Independent   Distance Ambulation (Feet) (community)   Assistive Devices Used None   Stairs Independent   Cognition    Cognition /  Consciousness WDL   Level of Consciousness Alert   Comments Pleasant & cooperative   Active ROM Lower Body    Active ROM Lower Body  WDL   Strength Lower Body   Lower Body Strength  WDL   Comments BLE grossly 5/5   Sensation Lower Body   Lower Extremity Sensation   WDL   Comments No report of altered sensation   Neurological Concerns   Neurological Concerns No   Balance Assessment   Sitting Balance (Static) Fair +   Sitting Balance (Dynamic) Fair   Standing Balance (Static) Fair +   Standing Balance (Dynamic) Fair   Weight Shift Sitting Fair   Weight Shift Standing Fair   Comments no AD   Gait Analysis   Gait Level Of Assist Supervised   Assistive Device None   Distance (Feet) 250   # of Times Distance was Traveled 1   Deviation No deviation   Weight Bearing Status No restrictions   Bed Mobility    Supine to Sit Supervised   Sit to Supine (Up in chair post session)   Functional Mobility   Sit to Stand Supervised   Bed, Chair, Wheelchair Transfer Supervised   Transfer Method Stand Step   Mobility Ambulation   ICU Target Mobility Level   ICU Mobility - Targeted Level Level 4   Activity Tolerance   Sitting in Chair Post session   Sitting Edge of Bed 5 min   Standing 3 min   Session Information   Date / Session Number  6/21 - 1x only

## 2021-06-21 NOTE — CARE PLAN
The patient is Watcher - Medium risk of patient condition declining or worsening    Shift Goals  Clinical Goals: Stable Neuro Exam; Mobility  Patient Goals: Improved strength; Perform ADLS  Family Goals: Comfort    Progress made toward(s) clinical / shift goals:    Problem: Pain - Standard  Goal: Alleviation of pain or a reduction in pain to the patient’s comfort goal  Outcome: Progressing     Problem: Knowledge Deficit - Standard  Goal: Patient and family/care givers will demonstrate understanding of plan of care, disease process/condition, diagnostic tests and medications  Outcome: Progressing     Problem: Self Care  Goal: Patient will have the ability to perform ADLs independently or with assistance (bathe, groom, dress, toilet and feed)  Outcome: Progressing     Problem: Urinary Elimination  Goal: Establish and maintain regular urinary output  Outcome: Progressing     Problem: Mobility  Goal: Patient's capacity to carry out activities will improve  Outcome: Progressing     Problem: Hemodynamics  Goal: Patient's hemodynamics, fluid balance and neurologic status will be stable or improve  Outcome: Progressing     Problem: Venous Thromboembolism (VTE) Prevention  Goal: The patient will remain free from venous thromboembolism (VTE)  Outcome: Progressing     Problem: Nutrition  Goal: Patient's nutritional and fluid intake will be adequate or improve  Outcome: Progressing

## 2021-06-22 LAB
ANION GAP SERPL CALC-SCNC: 14 MMOL/L (ref 7–16)
BUN SERPL-MCNC: 9 MG/DL (ref 8–22)
CALCIUM SERPL-MCNC: 8.7 MG/DL (ref 8.5–10.5)
CHLORIDE SERPL-SCNC: 103 MMOL/L (ref 96–112)
CO2 SERPL-SCNC: 19 MMOL/L (ref 20–33)
CREAT SERPL-MCNC: 0.48 MG/DL (ref 0.5–1.4)
GLUCOSE SERPL-MCNC: 106 MG/DL (ref 65–99)
MAGNESIUM SERPL-MCNC: 2.4 MG/DL (ref 1.5–2.5)
PHOSPHATE SERPL-MCNC: 2.9 MG/DL (ref 2.5–4.5)
POTASSIUM SERPL-SCNC: 4.2 MMOL/L (ref 3.6–5.5)
SODIUM SERPL-SCNC: 136 MMOL/L (ref 135–145)

## 2021-06-22 PROCEDURE — 700102 HCHG RX REV CODE 250 W/ 637 OVERRIDE(OP): Performed by: INTERNAL MEDICINE

## 2021-06-22 PROCEDURE — 80048 BASIC METABOLIC PNL TOTAL CA: CPT

## 2021-06-22 PROCEDURE — A9270 NON-COVERED ITEM OR SERVICE: HCPCS | Performed by: INTERNAL MEDICINE

## 2021-06-22 PROCEDURE — 770004 HCHG ROOM/CARE - ONCOLOGY PRIVATE *

## 2021-06-22 PROCEDURE — 700111 HCHG RX REV CODE 636 W/ 250 OVERRIDE (IP): Performed by: PSYCHIATRY & NEUROLOGY

## 2021-06-22 PROCEDURE — 84100 ASSAY OF PHOSPHORUS: CPT

## 2021-06-22 PROCEDURE — 99233 SBSQ HOSP IP/OBS HIGH 50: CPT | Performed by: INTERNAL MEDICINE

## 2021-06-22 PROCEDURE — 36415 COLL VENOUS BLD VENIPUNCTURE: CPT

## 2021-06-22 PROCEDURE — 99231 SBSQ HOSP IP/OBS SF/LOW 25: CPT | Performed by: NURSE PRACTITIONER

## 2021-06-22 PROCEDURE — 83735 ASSAY OF MAGNESIUM: CPT

## 2021-06-22 RX ADMIN — APIXABAN 10 MG: 5 TABLET, FILM COATED ORAL at 18:22

## 2021-06-22 RX ADMIN — LEVETIRACETAM 500 MG: 100 SOLUTION ORAL at 05:46

## 2021-06-22 RX ADMIN — ENOXAPARIN SODIUM 80 MG: 80 INJECTION SUBCUTANEOUS at 05:46

## 2021-06-22 RX ADMIN — LEVETIRACETAM 500 MG: 100 SOLUTION ORAL at 18:23

## 2021-06-22 RX ADMIN — DOCUSATE SODIUM 50 MG AND SENNOSIDES 8.6 MG 2 TABLET: 8.6; 5 TABLET, FILM COATED ORAL at 05:47

## 2021-06-22 ASSESSMENT — ENCOUNTER SYMPTOMS
DIZZINESS: 0
COUGH: 0
SORE THROAT: 0
NAUSEA: 0
DIARRHEA: 0
ABDOMINAL PAIN: 0
VOMITING: 0
SHORTNESS OF BREATH: 0
PALPITATIONS: 0
DOUBLE VISION: 0
CHILLS: 0
HEADACHES: 0
LOSS OF CONSCIOUSNESS: 0
FOCAL WEAKNESS: 1
BACK PAIN: 0
BLURRED VISION: 0
FEVER: 0

## 2021-06-22 NOTE — PROGRESS NOTES
Reported called to Onc RN Kanchan for NOC shift. All questions answered, patient pending transport up to room.

## 2021-06-22 NOTE — PROGRESS NOTES
4 Eyes Skin Assessment Completed by Kanchan RN and BLANCA Lorenzana.    Head WDL  Ears WDL  Nose WDL  Mouth WDL  Neck WDL  Breast/Chest WDL  Shoulder Blades WDL  Spine WDL  (R) Arm/Elbow/Hand Dry hands  (L) Arm/Elbow/Hand Dry hands  Abdomen WDL  Groin WDL  Scrotum/Coccyx/Buttocks WDL  (R) Leg WDL  (L) Leg WDL  (R) Heel/Foot/Toe Dry bottoms of feet  (L) Heel/Foot/Toe Dry bottoms of feet          Devices In Places SCD's      Interventions In Place Waffle Overlay    Possible Skin Injury No    Pictures Uploaded Into Epic N/A  Wound Consult Placed N/A  RN Wound Prevention Protocol Ordered No

## 2021-06-22 NOTE — PROGRESS NOTES
Neurology Progress Note  Neurohospitalist Service, [facility]    Referring Physician: Jose Woods DO    Reason for referral: acute onset LUE numbness and weakness    HPI: Refer to initial documented Neurology H&P, as detailed in the patient's chart.    Interval Note 6/21: *HISTORY TAKEN WITH USE OF : No acute events overnight. Denies headache, new symptoms.    Review of systems: In addition to what is detailed in the HPI and/or updated in the interval history, all other systems reviewed and are negative.    Past Medical History:    has a past medical history of Cancer (HCC).    FHx:  No history of strokes in the young    SHx:   reports that she has never smoked. She does not have any smokeless tobacco history on file.    Medications:    Current Facility-Administered Medications:   •  levETIRAcetam (KEPPRA) 100 MG/ML solution 500 mg, 500 mg, Oral, Q12HRS, Gavin Hathaway M.D., 500 mg at 06/22/21 0546  •  senna-docusate (PERICOLACE or SENOKOT S) 8.6-50 MG per tablet 2 tablet, 2 tablet, Oral, BID, 2 tablet at 06/22/21 0547 **AND** polyethylene glycol/lytes (MIRALAX) PACKET 1 Packet, 1 Packet, Oral, QDAY PRN **AND** magnesium hydroxide (MILK OF MAGNESIA) suspension 30 mL, 30 mL, Oral, QDAY PRN **AND** bisacodyl (DULCOLAX) suppository 10 mg, 10 mg, Rectal, QDAY PRN, Chris Rajan M.D.  •  Respiratory Therapy Consult, , Nebulization, Continuous RT, Chris Rajan M.D.  •  LORazepam (ATIVAN) injection 2 mg, 2 mg, Intravenous, Q5 MIN PRN, Chris Rajan M.D.  •  ondansetron (ZOFRAN ODT) dispertab 4 mg, 4 mg, Oral, Q4HRS PRN **OR** ondansetron (ZOFRAN) syringe/vial injection 4 mg, 4 mg, Intravenous, Q4HRS PRN, Chris Rajan M.D.  •  promethazine (PHENERGAN) tablet 12.5-25 mg, 12.5-25 mg, Oral, Q4HRS PRN, Chris Rajan M.D.  •  promethazine (PHENERGAN) suppository 12.5-25 mg, 12.5-25 mg, Rectal, Q4HRS PRN, Chris Rajan M.D.  •  prochlorperazine (COMPAZINE) injection  5-10 mg, 5-10 mg, Intravenous, Q4HRS PRN, Chris Rajan M.D.  •  acetaminophen (Tylenol) tablet 650 mg, 650 mg, Oral, Q4HRS PRN **OR** acetaminophen (TYLENOL) suppository 650 mg, 650 mg, Rectal, Q4HRS PRN, Sal Arndt M.D.  •  labetalol (NORMODYNE/TRANDATE) injection 10 mg, 10 mg, Intravenous, Q4HRS PRN, Sal Arndt M.D.  •  hydrALAZINE (APRESOLINE) injection 10 mg, 10 mg, Intravenous, Q4HRS PRN, Sal Arndt M.D.  •  enalaprilat (VASOTEC) injection 1.25 mg, 1.25 mg, Intravenous, Q6HRS PRN, Sal Arndt M.D.  •  enoxaparin (LOVENOX) inj 80 mg, 1 mg/kg, Subcutaneous, Q12HRS, Franki Valladares M.D., 80 mg at 06/22/21 0546    Physical Examination:     Vitals:    06/21/21 2027 06/22/21 0038 06/22/21 0544 06/22/21 0703   BP: 119/77 120/75 109/70 113/75   Pulse: 80 93 80 79   Resp: 19 18 16 17   Temp: 36.8 °C (98.2 °F) 36.2 °C (97.2 °F) 36.9 °C (98.5 °F) 36.2 °C (97.1 °F)   TempSrc: Temporal Temporal Temporal Temporal   SpO2: 99% 99% 97% 98%   Weight:       Height:         *EXAM PERFORMED WITH USE OF :    General: Alert, awake and in no acute distress  Eyes: examination of optic disks not indicated at this time  CV: NSR    NEUROLOGICAL EXAM:     Mental status: Awake, alert and fully oriented, follows commands  Speech and language: speech is not dysarthric. The patient is able to name and repeat.  Cranial nerve exam: Pupils are equal, round and reactive to light bilaterally. Visual fields are full. Extraocular muscles are intact. Sensation in the face is intact to light touch. Face is symmetric. Hearing to finger rub equal. Palate elevates symmetrically. Shoulder shrug is full. Tongue is midline.  Motor exam: Strength is 4/5 in distal LUE, 5/5 proximally. All other extremities 5/5 both distally and proximally. Tone is normal. No abnormal movements were seen on exam.  Sensory exam: Mildly decreased sensation in LUE compared to RUE  Deep tendon reflexes: 2+ and symmetric. Toes down-going  bilaterally.  Coordination: no ataxia with spontaneous movements  Gait: deferred given patient preference    Objective Data:    Labs:  Lab Results   Component Value Date/Time    PROTHROMBTM 13.6 06/19/2021 12:28 PM    INR 1.07 06/19/2021 12:28 PM      Lab Results   Component Value Date/Time    WBC 7.1 06/21/2021 05:19 AM    RBC 3.33 (L) 06/21/2021 05:19 AM    HEMOGLOBIN 12.3 06/21/2021 05:19 AM    HEMATOCRIT 35.5 (L) 06/21/2021 05:19 AM    .6 (H) 06/21/2021 05:19 AM    MCH 36.9 (H) 06/21/2021 05:19 AM    MCHC 34.6 06/21/2021 05:19 AM    MPV 9.8 06/21/2021 05:19 AM    NEUTSPOLYS 83.90 (H) 06/20/2021 02:12 AM    LYMPHOCYTES 12.80 (L) 06/20/2021 02:12 AM    MONOCYTES 3.00 06/20/2021 02:12 AM    EOSINOPHILS 0.00 06/20/2021 02:12 AM    BASOPHILS 0.00 06/20/2021 02:12 AM      Lab Results   Component Value Date/Time    SODIUM 136 06/22/2021 02:45 AM    POTASSIUM 4.2 06/22/2021 02:45 AM    CHLORIDE 103 06/22/2021 02:45 AM    CO2 19 (L) 06/22/2021 02:45 AM    GLUCOSE 106 (H) 06/22/2021 02:45 AM    BUN 9 06/22/2021 02:45 AM    CREATININE 0.48 (L) 06/22/2021 02:45 AM      Lab Results   Component Value Date/Time    CHOLSTRLTOT 121 06/19/2021 02:00 PM    LDL 58 06/19/2021 02:00 PM    HDL 38 (A) 06/19/2021 02:00 PM    TRIGLYCERIDE 123 06/19/2021 02:00 PM       Lab Results   Component Value Date/Time    ALKPHOSPHAT 122 (H) 06/20/2021 02:12 AM    ASTSGOT 48 (H) 06/20/2021 02:12 AM    ALTSGPT 65 (H) 06/20/2021 02:12 AM    TBILIRUBIN 0.8 06/20/2021 02:12 AM        Imaging/Testing:    I interpreted and/or reviewed the patient's neuroimaging    CT-HEAD W/O   Final Result      No significant change in intraparenchymal hemorrhage. Small amount of subarachnoid hemorrhage is more pronounced than on the prior exam. No midline shift         MR-BRAIN-WITH & W/O   Final Result   Addendum 1 of 1   On further review the images and comparison to CTA head performed earlier    the same date there is a subtle filling defect defect in the  superior    sagittal sinus near the vertex with what appear to be filling of small    peripheral veins in this region.    Similar findings are also present along the RIGHT transverse sinus. These    findings are suggestive of dural venous sinus thrombosis.      Findings were discussed with Dr. JUARES on 6/20/2021 10:25 AM.      Final      1.  RIGHT frontoparietal parenchymal hemorrhage without evidence of underlying mass or vascular malformation. This appears slightly larger than on the CT.   2.   Small amount of RIGHT supratentorial subarachnoid blood   3.  No herniation      DX-CHEST-PORTABLE (1 VIEW)   Final Result         No acute cardiac or pulmonary abnormality is identified.      CT-ABDOMEN-PELVIS WITH   Final Result      1.  No CT evidence of metastasis      2.  Moderate hepatomegaly with severe steatosis      3.  Lower-outer left breast postoperative seroma favored over abscess or residual mass. Recommend clinical correlation      CT-CEREBRAL PERFUSION ANALYSIS   Final Result      1.  Cerebral blood flow less than 30% likely representing completed infarct = 0 mL.      2.  T Max more than 6 seconds likely representing combination of completed infarct and ischemia = 0 mL.      3.  Mismatched volume likely representing ischemic brain/penumbra = None      4.  Please note that the cerebral perfusion was performed on the limited brain tissue around the basal ganglia region. Infarct/ischemia outside the CT perfusion sections can be missed in this study.      CT-CTA NECK WITH & W/O-POST PROCESSING   Final Result      CT angiogram of the neck within normal limits.      CT-CTA HEAD WITH & W/O-POST PROCESS   Final Result      CT angiogram of the [place] within normal limits.      CT-HEAD W/O   Final Result         1.  Multifocal parenchymal hemorrhage with some surrounding edema is identified in the right parietal lobe as described above. Differential diagnosis does include parenchymal bleeding related to a  brain neoplasm. MRI with and without contrast could be    obtained for further evaluation.      2.  Mild adjacent subarachnoid hemorrhage is also noted.      These findings were discussed with NIKOLAS DICKINSON on 06/19/2021.          Assessment and Plan:    46F with history of breast cancer currently undergoing chemotherapy who presented [facility] with c/o left arm weakness and numbness and for whom neurology was consulted with concern for stroke. Initial NIHSS 3 for LUE weakness with drift and sensation changes. Noncontrast CTH revealed intraparenchymal hemorrhage with edema in the right parietal lobe; thus, not a tPA candidate. CTA head and neck negative for LVO, high-grade stenosis, or aneurysm; however, did reveal enhancement of the superior sagittal sinus vein concerning for central venous thrombosis. 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 Started on therapeutic enoxaparin 1mg/kg q12hr. Etiology of venous thrombosis is likely hypercoagulability in the setting of cancer. An interval CT head showed stability. No further recommendations or further studies from an acute neurological standpoint at this time. Please re-consult if you have further questions or there is a change in status.     Plan:     - BP goal 100-140/60-80  - Continue enoxaparin 80mg SQ q12hr. Patient will need continued AC given concomitant cancer.   - Serum sodium goal: normal    The evaluation of the patient, and recommended management, was discussed with attending neurologist, Dr. Jacquie Herrera. I have performed a physical exam and reviewed and updated ROS and Plan today (6/22/2021). In review of yesterday's note (6/21/2021), there are no changes except as documented above.    Corinne E. Canavero, APRN  Neurohospitalist APRN, Acute Care Services

## 2021-06-22 NOTE — DISCHARGE PLANNING
Anticipated Discharge Disposition: Home with help    Action: Pt was discussed in IDT rounds, likely no discharge needs at this time. Bedside RN stated that pt would like to talk to SW regarding hospital stay costs. LSW met with pt, & pt's spouse Vivek at bedside to collect assessment ( was used). LSW stated we could look into possibly qualifying for emergency Medicaid to cover her stay here but unlikely due to [place] residence. Pt and spouse gave verbal understanding. LSW emailed PFA to verify this, PFA stated due to residency status pt does not qualify.    Barriers to Discharge: None at this time    Plan: LSW to follow up with pt's daughter tomorrow regarding PFA update, follow up with medication needs (pt     Care Transition Team Assessment  Pt's NOK is spouse Vivek Umanzor #[phone]  An  was used for this assessment, pt primary language is Tristanian. Pt lives with spouse and near adult child. Pt currently receiving oncology tx in [place], [place]. No SSN reported, LSW provided this information to PFA to add. Pt reports that daughter Jessie helps a lot at home and lives in a separate house on the same property so is able to check on her and help frequently. Pt reports that she is currently not working. Pt reports being current with PCP (Dr Malik Escobar in [place]), and never using HH or been to SN. Pt denies having AD, refused booklet. Medications are only dc need at this time, pt will require to dc with Eliquis and does not have [place] prescription coverage.    Information Source  Orientation Level: Oriented X4  Information Given By: Patient  Who is responsible for making decisions for patient? : Patient    Readmission Evaluation  Is this a readmission?: No    Elopement Risk  Legal Hold: No  Ambulatory or Self Mobile in Wheelchair: Yes  Disoriented: No  Psychiatric Symptoms: None  History of Wandering: No  Elopement this Admit: No  Vocalizing Wanting to Leave: No  Displays  Behaviors, Body Language Wanting to Leave: No-Not at Risk for Elopement  Elopement Risk: Not at Risk for Elopement    Interdisciplinary Discharge Planning  Primary Care Physician: Dr. Huntley   Lives with - Patient's Self Care Capacity: Spouse  Patient or legal guardian wants to designate a caregiver: No  Support Systems: Spouse / Significant Other, Children  Housing / Facility: 1 Story House  Able to Return to Previous ADL's: Yes  Mobility Issues: No  Prior Services: None  Patient Prefers to be Discharged to:: Home    Discharge Preparedness  What is your plan after discharge?: Home with help  What are your discharge supports?: Child, Spouse  Prior Functional Level: Independent with Activities of Daily Living  Difficulity with ADLs: None  Difficulity with IADLs: None    Functional Assesment  Prior Functional Level: Independent with Activities of Daily Living    Finances  Financial Barriers to Discharge: Yes  Prescription Coverage: Yes    Advance Directive  Advance Directive?: None  Advance Directive offered?: AD Booklet refused    Domestic Abuse  Have you ever been the victim of abuse or violence?: No  Physical Abuse or Sexual Abuse: No  Verbal Abuse or Emotional Abuse: No  Possible Abuse/Neglect Reported to:: Not Applicable    Psychological Assessment  History of Substance Abuse: None  History of Psychiatric Problems: No  Non-compliant with Treatment: No  Newly Diagnosed Illness: No    Discharge Risks or Barriers  Discharge risks or barriers?: Uninsured / underinsured  Patient risk factors: Complex medical needs    Anticipated Discharge Information  Discharge Disposition: Still a Patient (30)  Discharge Address: [address] [place]  Discharge Contact Phone Number: [phone]

## 2021-06-22 NOTE — CARE PLAN
The patient is Stable - Low risk of patient condition declining or worsening    Shift Goals  Clinical Goals: SBP within goal and stable neuro checks  Patient Goals: ambulate  Family Goals: POC updates    Progress made toward(s) clinical / shift goals:    Problem: Pain - Standard  Goal: Alleviation of pain or a reduction in pain to the patient’s comfort goal  Outcome: Progressing     Problem: Knowledge Deficit - Standard  Goal: Patient and family/care givers will demonstrate understanding of plan of care, disease process/condition, diagnostic tests and medications  Outcome: Progressing  Note:  used to orient patient to unit and discuss POC with pt and family.     Problem: Skin Integrity  Goal: Skin integrity is maintained or improved  Outcome: Progressing     Problem: Neuro Status  Goal: Neuro status will remain stable or improve  Outcome: Progressing     Problem: Self Care  Goal: Patient will have the ability to perform ADLs independently or with assistance (bathe, groom, dress, toilet and feed)  Outcome: Progressing       Patient is not progressing towards the following goals:

## 2021-06-22 NOTE — DISCHARGE PLANNING
[facility] Transitional Care Coordination    Follow up for Rehab.  Current documentation does not demonstrate ongoing acute therapy need in 2 of 3 therapy disciplines meeting CMS criteria for IRF level care.  PMR referral will not be forwarded for consult.  If there are interval changes, please reach out to Rehab [facility] [id number].     Thank you for the referral.

## 2021-06-22 NOTE — PROGRESS NOTES
[facility] Medicine Daily Progress Note    Date of Service  6/22/2021    Chief Complaint  46 y.o. female admitted 6/19/2021 with L side weakness    Hospital Course  Ms. Pily Buckley is a 46 y.o. female with history of breast cancer on Xeloda, status post lumpectomy who presented on 6/19/2021 with left upper extremity weakness.  Her initial NIH score was 3.  A code stroke was activated.  CT head showed intraparenchymal hemorrhage with edema in the right parietal lobe.  CT angiogram of the head and neck showed no large vessel occlusion, high-grade stenosis, aneurysm.  She was admitted to the ICU and started on Keppra and Decadron.  Neurology and neurosurgery were consulted.  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  Patient was started on therapeutic Lovenox.  After discussion with her oncologist in [place] patient will be discharged on apixaban.      Interval Problem Update  Patient was seen and examined at bedside.  I have personally reviewed and interpreted vitals, labs, and imaging.    6/22.  Afebrile.  1 episode of hypotension overnight.  On room air.  Denies fevers, chills, chest pains, shortness of breath.  Left upper extremity weakness is improving.  Patient is ambulatory.  Transition to apixaban.    Consultants/Specialty  Neuro  Neuro Srgry    Code Status  Full Code    Disposition  Plan DC likely on DOAC  Outpatient OT    Review of Systems  Review of Systems   Constitutional: Negative for chills and fever.   HENT: Negative for nosebleeds and sore throat.    Eyes: Negative for blurred vision and double vision.   Respiratory: Negative for cough and shortness of breath.    Cardiovascular: Negative for chest pain, palpitations and leg swelling.   Gastrointestinal: Negative for abdominal pain, diarrhea, nausea and vomiting.   Genitourinary: Negative for dysuria and urgency.   Musculoskeletal: Negative for back pain.   Skin: Negative for rash.   Neurological: Positive for focal weakness. Negative for  dizziness, loss of consciousness and headaches.        Physical Exam  Temp:  [36 °C (96.8 °F)-36.9 °C (98.5 °F)] 36.2 °C (97.1 °F)  Pulse:  [] 79  Resp:  [15-25] 17  BP: ()/(58-77) 113/75  SpO2:  [96 %-99 %] 98 %    Physical Exam  Vitals and nursing note reviewed.   Constitutional:       General: She is not in acute distress.     Appearance: She is well-developed.   HENT:      Head: Normocephalic and atraumatic.      Right Ear: External ear normal.      Left Ear: External ear normal.      Nose: Nose normal.      Mouth/Throat:      Mouth: Mucous membranes are moist.      Pharynx: Oropharynx is clear.   Eyes:      Extraocular Movements: Extraocular movements intact.      Conjunctiva/sclera: Conjunctivae normal.   Neck:      Vascular: No JVD.   Cardiovascular:      Rate and Rhythm: Normal rate and regular rhythm.      Pulses: Normal pulses.      Heart sounds: Normal heart sounds. No murmur heard.     Pulmonary:      Effort: Pulmonary effort is normal. No respiratory distress.      Breath sounds: No stridor. No wheezing or rales.   Abdominal:      General: Abdomen is flat. Bowel sounds are normal. There is no distension.      Palpations: Abdomen is soft.      Tenderness: There is no abdominal tenderness.   Musculoskeletal:         General: Normal range of motion.      Cervical back: Normal range of motion.      Right lower leg: No edema.      Left lower leg: No edema.   Skin:     General: Skin is warm.   Neurological:      General: No focal deficit present.      Mental Status: She is alert and oriented to person, place, and time. Mental status is at baseline.      Cranial Nerves: No cranial nerve deficit.      Motor: Weakness (LUE 4/5) present.   Psychiatric:         Thought Content: Thought content normal.         Fluids  No intake or output data in the 24 hours ending 06/22/21 0738    Laboratory  Recent Labs     06/19/21  1228 06/20/21  0212 06/21/21  0519   WBC 4.8 5.8 7.1   RBC 3.67* 3.41* 3.33*    HEMOGLOBIN 13.4 12.3 12.3   HEMATOCRIT 39.3 36.4* 35.5*   .1* 106.7* 106.6*   MCH 36.5* 36.1* 36.9*   MCHC 34.1 33.8 34.6   RDW 55.5* 54.9* 55.5*   PLATELETCT 265 255 258   MPV 9.5 9.7 9.8     Recent Labs     06/19/21  1228 06/19/21  1400 06/20/21  0212 06/20/21  1209 06/20/21  1750 06/21/21  0052 06/21/21  0519   SODIUM 135   < > 138   < > 139 139 143   POTASSIUM 3.8  --  4.0  --   --   --  3.7   CHLORIDE 100  --  107  --   --   --  108   CO2 24  --  18*  --   --   --  23   GLUCOSE 104*  --  161*  --   --   --  114*   BUN 7*  --  6*  --   --   --  7*   CREATININE 0.46*  --  0.42*  --   --   --  0.39*   CALCIUM 9.1  --  8.6  --   --   --  7.9*    < > = values in this interval not displayed.     Recent Labs     06/19/21  1228   APTT 21.7*   INR 1.07         Recent Labs     06/19/21  1400   TRIGLYCERIDE 123   HDL 38*   LDL 58       Imaging  CT-HEAD W/O   Final Result      No significant change in intraparenchymal hemorrhage. Small amount of subarachnoid hemorrhage is more pronounced than on the prior exam. No midline shift         MR-BRAIN-WITH & W/O   Final Result   Addendum 1 of 1   On further review the images and comparison to CTA head performed earlier    the same date there is a subtle filling defect defect in the superior    sagittal sinus near the vertex with what appear to be filling of small    peripheral veins in this region.    Similar findings are also present along the RIGHT transverse sinus. These    findings are suggestive of dural venous sinus thrombosis.      Findings were discussed with Dr. JUARES on 6/20/2021 10:25 AM.      Final      1.  RIGHT frontoparietal parenchymal hemorrhage without evidence of underlying mass or vascular malformation. This appears slightly larger than on the CT.   2.   Small amount of RIGHT supratentorial subarachnoid blood   3.  No herniation      DX-CHEST-PORTABLE (1 VIEW)   Final Result         No acute cardiac or pulmonary abnormality is identified.       CT-ABDOMEN-PELVIS WITH   Final Result      1.  No CT evidence of metastasis      2.  Moderate hepatomegaly with severe steatosis      3.  Lower-outer left breast postoperative seroma favored over abscess or residual mass. Recommend clinical correlation      CT-CEREBRAL PERFUSION ANALYSIS   Final Result      1.  Cerebral blood flow less than 30% likely representing completed infarct = 0 mL.      2.  T Max more than 6 seconds likely representing combination of completed infarct and ischemia = 0 mL.      3.  Mismatched volume likely representing ischemic brain/penumbra = None      4.  Please note that the cerebral perfusion was performed on the limited brain tissue around the basal ganglia region. Infarct/ischemia outside the CT perfusion sections can be missed in this study.      CT-CTA NECK WITH & W/O-POST PROCESSING   Final Result      CT angiogram of the neck within normal limits.      CT-CTA HEAD WITH & W/O-POST PROCESS   Final Result      CT angiogram of the [place] within normal limits.      CT-HEAD W/O   Final Result         1.  Multifocal parenchymal hemorrhage with some surrounding edema is identified in the right parietal lobe as described above. Differential diagnosis does include parenchymal bleeding related to a brain neoplasm. MRI with and without contrast could be    obtained for further evaluation.      2.  Mild adjacent subarachnoid hemorrhage is also noted.      These findings were discussed with NIKOLAS DICKINSON on 06/19/2021.           Assessment/Plan  * Intracranial hemorrhage (HCC)- (present on admission)  Assessment & Plan  R fronto/parietal IPH thought secondary to superior venous thrombosis  Non surgical management  MRI did not show any other issues  PT/OT consulted  DC on Apixaban  Dw [place]'s Heme/[place]c  in [place]  (Dr. Escobar [phone] in [place])    Thrombosis of superior sagittal sinus  Assessment & Plan  Noted on MRI w/wo.    Doing MRV to confirm  On enoxaparin 80mg/kg  DW   Kylah pt's Heme/Onc in [place]: she agrees with DC on apixaban    Abdominal spasms- (present on admission)  Assessment & Plan  Possibly related to partial seizure  CT of the abdomen and pelvis with contrast showed no evidence of metastasis, there is moderate hepatomegaly with severe steatosis, left outer breast postop seroma    History of breast cancer- (present on admission)  Assessment & Plan  S/p lumpectomy, XRT, IV chemo and is now finishing course of po Xeloda  Deshpande CT done here (chest/abd/plvs) showing no evidence of metastatsis  MRI brain likewise neg for mets  F/u Onc in [place]       VTE prophylaxis: Apixaban

## 2021-06-23 ENCOUNTER — PHARMACY VISIT (OUTPATIENT)
Dept: PHARMACY | Facility: MEDICAL CENTER | Age: 46
End: 2021-06-23
Payer: COMMERCIAL

## 2021-06-23 VITALS
WEIGHT: 154.54 LBS | RESPIRATION RATE: 18 BRPM | OXYGEN SATURATION: 98 % | BODY MASS INDEX: 29.18 KG/M2 | SYSTOLIC BLOOD PRESSURE: 128 MMHG | TEMPERATURE: 97.3 F | DIASTOLIC BLOOD PRESSURE: 80 MMHG | HEIGHT: 61 IN | HEART RATE: 106 BPM

## 2021-06-23 PROBLEM — R10.9 ABDOMINAL SPASMS: Status: RESOLVED | Noted: 2021-06-19 | Resolved: 2021-06-23

## 2021-06-23 LAB
ANION GAP SERPL CALC-SCNC: 11 MMOL/L (ref 7–16)
BASOPHILS # BLD AUTO: 1.2 % (ref 0–1.8)
BASOPHILS # BLD: 0.06 K/UL (ref 0–0.12)
BUN SERPL-MCNC: 10 MG/DL (ref 8–22)
CALCIUM SERPL-MCNC: 8.7 MG/DL (ref 8.5–10.5)
CHLORIDE SERPL-SCNC: 106 MMOL/L (ref 96–112)
CO2 SERPL-SCNC: 24 MMOL/L (ref 20–33)
CREAT SERPL-MCNC: 0.56 MG/DL (ref 0.5–1.4)
EOSINOPHIL # BLD AUTO: 0.09 K/UL (ref 0–0.51)
EOSINOPHIL NFR BLD: 1.8 % (ref 0–6.9)
ERYTHROCYTE [DISTWIDTH] IN BLOOD BY AUTOMATED COUNT: 58 FL (ref 35.9–50)
GLUCOSE SERPL-MCNC: 114 MG/DL (ref 65–99)
HCT VFR BLD AUTO: 39.4 % (ref 37–47)
HGB BLD-MCNC: 13.2 G/DL (ref 12–16)
IMM GRANULOCYTES # BLD AUTO: 0.07 K/UL (ref 0–0.11)
IMM GRANULOCYTES NFR BLD AUTO: 1.4 % (ref 0–0.9)
LYMPHOCYTES # BLD AUTO: 1.4 K/UL (ref 1–4.8)
LYMPHOCYTES NFR BLD: 28.3 % (ref 22–41)
MAGNESIUM SERPL-MCNC: 2.4 MG/DL (ref 1.5–2.5)
MCH RBC QN AUTO: 36.3 PG (ref 27–33)
MCHC RBC AUTO-ENTMCNC: 33.5 G/DL (ref 33.6–35)
MCV RBC AUTO: 108.2 FL (ref 81.4–97.8)
MONOCYTES # BLD AUTO: 0.73 K/UL (ref 0–0.85)
MONOCYTES NFR BLD AUTO: 14.7 % (ref 0–13.4)
NEUTROPHILS # BLD AUTO: 2.6 K/UL (ref 2–7.15)
NEUTROPHILS NFR BLD: 52.6 % (ref 44–72)
NRBC # BLD AUTO: 0 K/UL
NRBC BLD-RTO: 0 /100 WBC
PHOSPHATE SERPL-MCNC: 3.8 MG/DL (ref 2.5–4.5)
PLATELET # BLD AUTO: 300 K/UL (ref 164–446)
PMV BLD AUTO: 9.5 FL (ref 9–12.9)
POTASSIUM SERPL-SCNC: 4.2 MMOL/L (ref 3.6–5.5)
RBC # BLD AUTO: 3.64 M/UL (ref 4.2–5.4)
SODIUM SERPL-SCNC: 141 MMOL/L (ref 135–145)
WBC # BLD AUTO: 5 K/UL (ref 4.8–10.8)

## 2021-06-23 PROCEDURE — 85025 COMPLETE CBC W/AUTO DIFF WBC: CPT

## 2021-06-23 PROCEDURE — 84100 ASSAY OF PHOSPHORUS: CPT

## 2021-06-23 PROCEDURE — 700102 HCHG RX REV CODE 250 W/ 637 OVERRIDE(OP): Performed by: INTERNAL MEDICINE

## 2021-06-23 PROCEDURE — RXMED WILLOW AMBULATORY MEDICATION CHARGE: Performed by: INTERNAL MEDICINE

## 2021-06-23 PROCEDURE — 99239 HOSP IP/OBS DSCHRG MGMT >30: CPT | Performed by: INTERNAL MEDICINE

## 2021-06-23 PROCEDURE — A9270 NON-COVERED ITEM OR SERVICE: HCPCS | Performed by: INTERNAL MEDICINE

## 2021-06-23 PROCEDURE — 80048 BASIC METABOLIC PNL TOTAL CA: CPT

## 2021-06-23 PROCEDURE — 36415 COLL VENOUS BLD VENIPUNCTURE: CPT

## 2021-06-23 PROCEDURE — 83735 ASSAY OF MAGNESIUM: CPT

## 2021-06-23 RX ORDER — LEVETIRACETAM 100 MG/ML
500 SOLUTION ORAL EVERY 12 HOURS
Qty: 300 ML | Refills: 0 | Status: SHIPPED | OUTPATIENT
Start: 2021-06-23 | End: 2021-07-23

## 2021-06-23 RX ADMIN — LEVETIRACETAM 500 MG: 100 SOLUTION ORAL at 04:18

## 2021-06-23 RX ADMIN — APIXABAN 10 MG: 5 TABLET, FILM COATED ORAL at 04:18

## 2021-06-23 NOTE — DISCHARGE SUMMARY
Discharge Summary    CHIEF COMPLAINT ON ADMISSION  Chief Complaint   Patient presents with   • Extremity Weakness     LKW 11:55 sudden onset LUE weakness/numbness, LUE drift noted at triage. No other stroke sx noted at triage by neurology   • Abdominal Pain     LLQ pain       Reason for Admission  Poss Stroke     Admission Date  6/19/2021    CODE STATUS  Full Code    HPI & HOSPITAL COURSE  Ms. Pily Buckley is a 46 y.o. female with history of breast cancer on Xeloda, status post lumpectomy who presented on 6/19/2021 with left upper extremity weakness.  Her initial NIH score was 3.  A code stroke was activated.  CT head showed intraparenchymal hemorrhage with edema in the right parietal lobe.  CT angiogram of the head and neck showed no large vessel occlusion, high-grade stenosis, aneurysm.  She was admitted to the ICU and started on Keppra and Decadron.  Neurology and neurosurgery were consulted.  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  Patient was started on therapeutic Lovenox.  After discussion with her oncologist in [place] Dr. Escobar, patient will be discharged on apixaban.  Counseled her about importance of medical compliance on blood thinners and Keppra.  She should get a PCP and neurology follow-up in [place].  Apixaban was delivered by meds to beds prior to discharge.  Outpatient occupational therapy referral placed.  Follow-up with oncologist for continuation of chemo if indicated.      Therefore, she is discharged in fair and stable condition to home with close outpatient follow-up.    The patient met 2-midnight criteria for an inpatient stay at the time of discharge.    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  (Take 1 tablet by mouth 2 times a day.)  Dose: 5 mg     levETIRAcetam 100 MG/ML Soln  Commonly known as: KEPPRA   Take 5 mL by mouth every 12 hours for 30 days.  Dose: 500 mg        CONTINUE taking these medications      Instructions   acetaminophen 500 MG Tabs  Commonly known as: TYLENOL   Take 1,000 mg by mouth 2 times a day as needed for Moderate Pain.  Dose: 1,000 mg     vitamin D (Ergocalciferol) 1.25 MG (58180 UT) Caps capsule  Commonly known as: DRISDOL   Take 50,000 Units by mouth every Wednesday.  Dose: 50,000 Units        STOP taking these medications    capecitabine 500 MG tablet  Commonly known as: XELODA            Allergies  No Known Allergies    DIET  Orders Placed This Encounter   Procedures   • Diet Order Diet: Regular     Standing Status:   Standing     Number of Occurrences:   1     Order Specific Question:   Diet:     Answer:   Regular [1]       ACTIVITY  As tolerated.  Weight bearing as tolerated    CONSULTATIONS  Neuro  Neuro Srgry    PROCEDURES  None    LABORATORY  Lab Results   Component Value Date    SODIUM 141 06/23/2021    POTASSIUM 4.2 06/23/2021    CHLORIDE 106 06/23/2021    CO2 24 06/23/2021    GLUCOSE 114 (H) 06/23/2021    BUN 10 06/23/2021    CREATININE 0.56 06/23/2021        Lab Results   Component Value Date    WBC 5.0 06/23/2021    HEMOGLOBIN 13.2 06/23/2021    HEMATOCRIT 39.4 06/23/2021    PLATELETCT 300 06/23/2021        I discussed medications and side effects with the patient.  I discussed prognosis and importance of medical compliance with the patient.  I counseled the patient about diet, exercise, weight loss, smoking cessation, and life style modifications.  All questions and concerns have been addressed.  Total time of the discharge  process was 35 minutes.

## 2021-06-23 NOTE — PROGRESS NOTES
Assumed pt care - bedside report received.  Pt is aaox4-continues with LUE moderate  3out of 4- pt encouraged and is working with fine motor movements.  edu to pt to call with any changes in neuro.  Up self and steady.  piv patent/saline locked.  Pt has a port that had not been accessed - plan for d/c - will not access.  Good po intake - bm yesterday.  Denies pain.  Spouse at bedside.  iPad  in use.  Pt makes needs known - will continue to round.  Pt feels comfortable and wants to d/c home to [place] today.      meds to bed:  They will deliver meds today - informed them doctor is doing d/c.    Spouse and pt aware of payment and have cash at bedside when delivered.      Dr. lopez at bedside with ipad

## 2021-06-23 NOTE — CARE PLAN
The patient is Stable - Low risk of patient condition declining or worsening    Shift Goals  Clinical Goals: plan for d/c.   in use, continue working on fine motor movements.    Patient Goals: discharge home today  Family Goals:  pay for RX and home today    Progress made toward(s) clinical / shift goals:  yes    Problem: Neuro Status  Goal: Neuro status will remain stable or improve  Outcome: Progressing     Problem: Psychosocial  Goal: Patient's level of anxiety will decrease  Outcome: Progressing  Goal: Patient's ability to verbalize feelings about condition will improve  Outcome: Progressing  Goal: Patient and family will demonstrate ability to cope with life altering diagnosis and/or procedure  Outcome: Progressing     Problem: Venous Thromboembolism (VTE) Prevention  Goal: The patient will remain free from venous thromboembolism (VTE)  Outcome: Progressing

## 2021-06-23 NOTE — DISCHARGE INSTRUCTIONS
Hemorragia intracraneal  (Intracranial Hemorrhage)  La hemorragia intracraneal es el sangrado que ocurre en las capas que se encuentran entre el cráneo y el cerebro. Se produce la rotura de un vaso sanguíneo y hay un derrame de silas dentro de la cavidad craneal. Luego, el derrame de silas se acumula (hematoma). [employer] genera presión y daña las células cerebrales. La hemorragia puede ser leve o grave. Cuando los casos son graves, puede producirse un daño permanente o la muerte. Los síntomas pueden aparecer de manera repentina o manifestarse con el paso del tiempo. El diagnóstico y el tratamiento tempranos permiten pete mejor recuperación.  Hay cuatro tipos de hemorragias intracraneales: subaracnoidea, subdural, extradural o cerebral.  CAUSAS  · Lesión en la laura (traumatismo).  · Ruptura de aneurisma cerebral.  · Hemorragia de vasos sanguíneos que se desarrollan de forma anómala (malformación arteriovenosa).  · Trastornos hemorrágicos.  · Uso de anticoagulantes.  · Uso de ciertas drogas, no la cocaína.  En algunas personas con hemorragia intracraneal, la causa es desconocida.  FACTORES DE RIESGO  · Consumo de productos que contienen tabaco, no cigarrillos y tabaco de mascar.  · Presión arterial elevada (hipertensión arterial).  · Abusar del alcohol.  · Ser melody, especialmente en edad posmenopáusica.  · Tener antecedentes familiares de enfermedad de los vasos sanguíneos del cerebro (enfermedad cerebrovascular).  · Tener ciertos síndromes genéticos que producen enfermedades renales o del tejido conectivo.  SIGNOS Y SÍNTOMAS  · Dolor de laura súbito e intenso que no tiene causa aparente. Dolor de laura que generalmente se describe no el peor dolor de laura que haya sufrido.  · Náuseas o vómitos, especialmente si se combinan con otros síntomas, no dolor de laura.  · Debilidad o adormecimiento súbito de la christelle, el brazo o la pierna, especialmente en un lado del cuerpo.  · Dificultad repentina para  caminar o para  los brazos o las piernas.  · Confusión súbita.  · Cambios de personalidad súbitos.  · Dificultad para hablar (afasia) o comprender.  · Dificultad para tragar.  · Dificultad súbita en la visión de jeannie o ambos ojos.  · Visión doble.  · Mareos.  · Pérdida del equilibrio o de la coordinación.  · Intolerancia a la celio.  · Rigidez en el dane.  DIAGNÓSTICO  El médico le preguntará acerca de pat síntomas y le hará un examen físico. Si se sospecha pete hemorragia intracraneal, se pueden indicar varias pruebas. Estos estudios pueden incluir:  · Pete tomografía computarizada.  · Pete resonancia magnética (RM).  · Un angiograma cerebral.  · Pete punción veras (punción lumbar).  · Análisis de silas.  TRATAMIENTO  Se requiere un tratamiento inmediato en el hospital para reducir el riesgo de daño cerebral. El tratamiento dependerá de la causa de la hemorragia, dónde se encuentra, y el darcy de hemorragia y de daño. Los objetivos del tratamiento incluyen detener la hemorragia, reparar la causa, aliviar los síntomas y prevenir los problemas.  · Se pueden administrar medicamentos para:  ¨ Bajar la presión arterial (antihipertensivos).  ¨ Aliviar el dolor (analgésicos).  ¨ Aliviar las náuseas o los vómitos.  · Se puede necesitar pete cirugía para detener la hemorragia, reparar la causa que la provoca o extraer la silas.  · La rehabilitación puede ser necesaria para mejorar funciones cognitivas y cotidianas, dañadas por la enfermedad.  Los tratamientos suplementarios dependen de la duración, la gravedad y la causa de los síntomas. Los fisioterapeutas, los terapeutas del habla y los terapeutas ocupacionales lo evaluarán y trabajarán para mejorar las funciones dañadas por la hemorragia intracraneal. Se tomarán medidas para prevenir problemas a corto y jacoyb plazo, que incluyen infecciones por inhalar material extraño a los pulmones (neumonía por aspiración), coágulos sanguíneos en las piernas, escaras y  caídas.  INSTRUCCIONES PARA EL CUIDADO EN EL HOGAR  · [place] los medicamentos solamente no se lo haya indicado el médico.  · Coma alimentos sanos no se lo haya indicado el médico.  ¨ Para controlar la hipertensión arterial, se recomienda pete dieta con bajo contenido de sal (sodio), grasas saturadas y trans, y colesterol.  ¨ Los alimentos deben estar blandos o hechos puré, o los bocados deben ser pequeños para no aspirarse ni ahogarse.  ¨ Si los estudios demuestran que beal capacidad para tragar de manera monique se ha visto afectada, phoebe vez deba buscar la ayuda de los especialistas, no un nutricionista, un patólogo del habla y del lenguaje o un terapeuta ocupacional. Estos profesionales pueden enseñarle a obtener la nutrición que el organismo necesita sin correr ningún riesgo.  · Descanse y limite las actividades o movimientos, según las indicaciones del médico.  · No consuma ningún producto que contenga tabaco, lo que incluye cigarrillos, tabaco de mascar o cigarrillos electrónicos. Si necesita ayuda para dejar de fumar, consulte al médico.  · Limite el consumo de alcohol a no más de 1 medida por día si es melody y no está embarazada, y 2 medidas si es hombres. Pete medida equivale a 12 onzas de cerveza, 5 onzas de vino o 1½ onzas de bebidas alcohólicas de robert graduación.  · Tonja otros cambios en beal estilo de kirstin según las recomendaciones del médico.  · Debe controlar beal presión arterial según las indicaciones.  · Un ambiente seguro en el hogar es importante para reducir el riesgo de caídas. El médico podrá disponer que los especialistas evalúen beal hogar. También es importante colocar barras para sostén en la habitación y el baño. El médico le indicará un equipo especial para usar en el hogar, no un inodoro elevado o un asiento para la ducha.  · Tonja fisioterapia, terapia ocupacional y terapia del habla no se lo haya indicado el médico. Phoebe vez sea necesario realizar un tratamiento continuo para optimizar la  recuperación.  · Use un andador o un bastón en todo momento, si se lo indicó el médico.  · Concurra a todas las visitas de control con el médico y otros especialistas. [place] es importante. Estas incluyen derivaciones, fisioterapia y rehabilitación.  SOLICITE ATENCIÓN MÉDICA DE INMEDIATO SI:  · Siente súbitamente un dolor de laura intenso que no tiene causa aparente.  · Tiene náuseas o vómitos con otro síntoma.  · Siente debilidad o adormecimiento súbito de la christelle, el brazo o la pierna, especialmente en un lado del cuerpo.  · Tiene pete dificultad repentina para caminar o para  los brazos o las piernas.  · Se siente repentinamente confundido.  · Tiene dificultad para hablar (afasia) o comprender.  · Presenta, súbitamente, dificultad para kehinde de jeannie o ambos ojos.  · Súbitamente, pierde el equilibrio o la coordinación.  · Presenta rigidez en el dane.  · Tiene dificultad para respirar.  · Sufre la pérdida parcial o total de la conciencia.  Estos síntomas pueden representar un problema grave y ser pete emergencia. No espere hasta que los síntomas desaparezcan. Solicite atención médica de inmediato. Comuníquese con el servicio de emergencias de beal localidad (911 en los [place]). No conduzca por pat propios medios hasta el hospital.   Esta información no tiene no fin reemplazar el consejo del médico. Asegúrese de hacerle al médico cualquier pregunta que tenga.  Document Released: 07/15/2015 Document Revised: 04/10/2017 Document Reviewed: 02/11/2015  [employer] Interactive Patient Education © 2017 [employer]    Discharge Instructions per MANPREET PowersO.    DIET: Diet Order Diet: Regular    ACTIVITY: As tolerated    A proper diet that is low in grease, fat, and salt, along with 30 minutes of exercise per day will lead to weight loss, and better controlled blood sugar and blood pressure.    DIAGNOSIS: Intracranial hemorrhage (HCC)    Follow up with your Primary Care Provider Pcp as scheduled or  sooner if your symptoms persist or worsen.  Return to Emergency Room for sever chest pain, shortness of breath, signs of a stroke, or any other emergencies.        Discharge Instructions    Discharged to home by car with relative. Discharged via wheelchair, hospital escort: Yes.  Special equipment needed: Not Applicable    Be sure to schedule a follow-up appointment with your primary care doctor or any specialists as instructed.     Discharge Plan:   Diet Plan: Discussed  Activity Level: Discussed  Confirmed Follow up Appointment: Patient to Call and Schedule Appointment  Medication Reconciliation Updated: Yes    I understand that a diet low in cholesterol, fat, and sodium is recommended for good health. Unless I have been given specific instructions below for another diet, I accept this instruction as my diet prescription.   Other diet: regular    Special Instructions:     Stroke/CVA/TIA/Hemorrhagic Ischemia Discharge Instructions  You have had a stroke. Your risk factors have been identified as follows:  Ethnicity - -Americans and Hispanics are at a higher risk  It is important that you reduce your risk factors to avoid another stroke in the future. Here are some general guidelines to follow:  · Eat healthy - avoid food high in fat.  · Get regular exercise.  · Maintain a healthy weight.  · Avoid smoking.  · Avoid alcohol and illegal drug use.  · Take your medications as directed.  For more information regarding risk factors, refer to pages 17-19 in your Stroke Patient Education Guide. Stroke Education Guide was given to patient and family member.    Warning signs of a stroke include (which can also be found on page 3 of your Stroke Patient Education Guide):  · Sudden numbness of weakness of the face, arm or leg (especially on one side of the body).  · Sudden confusion, trouble speaking or understanding.  · Sudden trouble seeing in one or both eyes.  · Sudden trouble walking, dizziness, loss of balance or  coordination.  · Sudden severe headache with no known cause.  It is very important to get treatment quickly when a stroke occurs. If you experience any of the above warning signs, call [phone] immediately.     Some patients who have had a stroke will be going home on a blood thinner medication called Warfarin (Coumadin).  This medication requires very close monitoring and follow up.  This follow up can be provided by either your Primary Care Physician or by [facility] Outpatient Anticoagulation Service.  The Outpatient Anticoagulation Service is located at the [facility] at [facility] ([facility]).  If you do not know when your follow up appointment is scheduled, call [phone] to verify your appointment time.      · Is patient discharged on Warfarin / Coumadin?   No     Depression / Suicide Risk    As you are discharged from this [facility], it is important to learn how to keep safe from harming yourself.    Recognize the warning signs:  · Abrupt changes in personality, positive or negative- including increase in energy   · Giving away possessions  · Change in eating patterns- significant weight changes-  positive or negative  · Change in sleeping patterns- unable to sleep or sleeping all the time   · Unwillingness or inability to communicate  · Depression  · Unusual sadness, discouragement and loneliness  · Talk of wanting to die  · Neglect of personal appearance   · Rebelliousness- reckless behavior  · Withdrawal from people/activities they love  · Confusion- inability to concentrate     If you or a loved one observes any of these behaviors or has concerns about self-harm, here's what you can do:  · Talk about it- your feelings and reasons for harming yourself  · Remove any means that you might use to hurt yourself (examples: pills, rope, extension cords, firearm)  · Get professional help from the community (Mental Health, Substance Abuse,  psychological counseling)  · Do not be alone:Call your Safe Contact- someone whom you trust who will be there for you.  · Call your local CRISIS HOTLINE 606-9404 or 088-350-4610  · Call your local Children's Mobile Crisis Response Team [place] (922) 885-7376 or www.[place]  · Call the toll free National Suicide Prevention Hotlines   · National Suicide Prevention Lifeline 216-008-WMPJ (0313)  · National Hope Line Network 800-SUICIDE (363-2492)

## 2021-06-23 NOTE — CARE PLAN
The patient is Stable - Low risk of patient condition declining or worsening    Shift Goals  Clinical Goals: fall precautions/work with OT fine movement-play for home with preaution medications/  Patient Goals: home/shower  Family Goals: poc updates    Progress made toward(s) clinical / shift goals:  yes    Patient is not progressing towards the following goals:      Problem: Knowledge Deficit - Standard  Goal: Patient and family/care givers will demonstrate understanding of plan of care, disease process/condition, diagnostic tests and medications  Outcome: Progressing  Note:   Switch from lovenox to apixaban - educations given  Plan for home - sw working on preAuth  Mobilization - work with small muscle movement on Left hand  Shower today  Oral care  Denies pain  Good po - bm today.    Edu to call with any changes in neuro baseline of this morning.

## 2021-06-23 NOTE — CARE PLAN
The patient is Stable - Low risk of patient condition declining or worsening    Shift Goals  Clinical Goals: fall precautions/work with OT fine movement-play for home with preaution medications/  Patient Goals: home/shower  Family Goals: poc updates    Progress made toward(s) clinical / shift goals:    Problem: Pain - Standard  Goal: Alleviation of pain or a reduction in pain to the patient’s comfort goal  Outcome: Progressing     Problem: Knowledge Deficit - Standard  Goal: Patient and family/care givers will demonstrate understanding of plan of care, disease process/condition, diagnostic tests and medications  Outcome: Progressing  Note:  used to assess and communicate with patient.     Problem: Neuro Status  Goal: Neuro status will remain stable or improve  Outcome: Progressing       Patient is not progressing towards the following goals:

## 2021-06-23 NOTE — DISCHARGE PLANNING
Anticipated Discharge Disposition: Home    Action: CM spoke with pt at bedside regarding Eliquis prescription.  Pt informed Rx will cost $65.44 for 30 day supply. Pt reports she can pay that amount.  Pharmacy to deliver to bedside via Meds to Beds pharmacist. Pt, MD, and bedside RN notified.     Barriers to Discharge: None    Plan: Discharge home.

## 2021-06-24 NOTE — DISCHARGE PLANNING
Meds-to-Beds: Discharge prescription orders listed below delivered to patient's bedside. RN Lana notified. Patient counseled. Patient verbalizes understanding of medications. Patient elected to have co-payment billed as cash.    Reviewed signs and symptoms of bleeding and when to seek medical attention. Reviewed potential drug-drug interactions.       Pily Buckley   Home Medication Instructions MARIA ANTONIA:[id number]    Printed on:06/23/21 [id number]   Medication Information                      apixaban (ELIQUIS) 5mg Tab  Take 1 tablet by mouth 2 times a day.             levETIRAcetam (KEPPRA) 100 MG/ML Solution  Take 5 mL by mouth every 12 hours for 30 days.                 Madalyn Pearl, PharmD